# Patient Record
Sex: FEMALE | Race: WHITE | NOT HISPANIC OR LATINO | Employment: OTHER | URBAN - METROPOLITAN AREA
[De-identification: names, ages, dates, MRNs, and addresses within clinical notes are randomized per-mention and may not be internally consistent; named-entity substitution may affect disease eponyms.]

---

## 2017-02-03 ENCOUNTER — COMMUNICATION - HEALTHEAST (OUTPATIENT)
Dept: FAMILY MEDICINE | Facility: CLINIC | Age: 82
End: 2017-02-03

## 2017-02-06 ENCOUNTER — COMMUNICATION - HEALTHEAST (OUTPATIENT)
Dept: FAMILY MEDICINE | Facility: CLINIC | Age: 82
End: 2017-02-06

## 2017-02-06 DIAGNOSIS — I10 ESSENTIAL HYPERTENSION, BENIGN: ICD-10-CM

## 2017-02-22 ENCOUNTER — OFFICE VISIT - HEALTHEAST (OUTPATIENT)
Dept: FAMILY MEDICINE | Facility: CLINIC | Age: 82
End: 2017-02-22

## 2017-02-22 DIAGNOSIS — I10 ESSENTIAL HYPERTENSION: ICD-10-CM

## 2017-02-22 ASSESSMENT — MIFFLIN-ST. JEOR: SCORE: 868.55

## 2017-03-03 ENCOUNTER — COMMUNICATION - HEALTHEAST (OUTPATIENT)
Dept: FAMILY MEDICINE | Facility: CLINIC | Age: 82
End: 2017-03-03

## 2017-05-01 ENCOUNTER — COMMUNICATION - HEALTHEAST (OUTPATIENT)
Dept: FAMILY MEDICINE | Facility: CLINIC | Age: 82
End: 2017-05-01

## 2017-05-01 DIAGNOSIS — I10 ESSENTIAL HYPERTENSION, BENIGN: ICD-10-CM

## 2017-05-30 ENCOUNTER — COMMUNICATION - HEALTHEAST (OUTPATIENT)
Dept: FAMILY MEDICINE | Facility: CLINIC | Age: 82
End: 2017-05-30

## 2017-05-30 DIAGNOSIS — I10 ESSENTIAL HYPERTENSION, BENIGN: ICD-10-CM

## 2017-07-30 ENCOUNTER — COMMUNICATION - HEALTHEAST (OUTPATIENT)
Dept: FAMILY MEDICINE | Facility: CLINIC | Age: 82
End: 2017-07-30

## 2017-07-30 DIAGNOSIS — I10 ESSENTIAL HYPERTENSION, BENIGN: ICD-10-CM

## 2017-07-31 ENCOUNTER — COMMUNICATION - HEALTHEAST (OUTPATIENT)
Dept: FAMILY MEDICINE | Facility: CLINIC | Age: 82
End: 2017-07-31

## 2017-07-31 DIAGNOSIS — I10 ESSENTIAL HYPERTENSION, BENIGN: ICD-10-CM

## 2017-08-16 ENCOUNTER — COMMUNICATION - HEALTHEAST (OUTPATIENT)
Dept: SCHEDULING | Facility: CLINIC | Age: 82
End: 2017-08-16

## 2017-09-16 ENCOUNTER — COMMUNICATION - HEALTHEAST (OUTPATIENT)
Dept: SCHEDULING | Facility: CLINIC | Age: 82
End: 2017-09-16

## 2017-09-17 ENCOUNTER — AMBULATORY - HEALTHEAST (OUTPATIENT)
Dept: OTHER | Facility: CLINIC | Age: 82
End: 2017-09-17

## 2017-09-19 ENCOUNTER — AMBULATORY - HEALTHEAST (OUTPATIENT)
Dept: NEUROSURGERY | Facility: CLINIC | Age: 82
End: 2017-09-19

## 2017-09-19 ENCOUNTER — COMMUNICATION - HEALTHEAST (OUTPATIENT)
Dept: NEUROSURGERY | Facility: CLINIC | Age: 82
End: 2017-09-19

## 2017-09-19 DIAGNOSIS — S32.001S LUMBAR BURST FRACTURE, SEQUELA: ICD-10-CM

## 2017-09-19 DIAGNOSIS — S32.019A: ICD-10-CM

## 2017-09-22 ENCOUNTER — OFFICE VISIT - HEALTHEAST (OUTPATIENT)
Dept: FAMILY MEDICINE | Facility: CLINIC | Age: 82
End: 2017-09-22

## 2017-09-22 ENCOUNTER — COMMUNICATION - HEALTHEAST (OUTPATIENT)
Dept: FAMILY MEDICINE | Facility: CLINIC | Age: 82
End: 2017-09-22

## 2017-09-22 ENCOUNTER — COMMUNICATION - HEALTHEAST (OUTPATIENT)
Dept: TELEHEALTH | Facility: CLINIC | Age: 82
End: 2017-09-22

## 2017-09-22 ENCOUNTER — RECORDS - HEALTHEAST (OUTPATIENT)
Dept: ADMINISTRATIVE | Facility: OTHER | Age: 82
End: 2017-09-22

## 2017-09-22 ENCOUNTER — HOME CARE/HOSPICE - HEALTHEAST (OUTPATIENT)
Dept: HOME HEALTH SERVICES | Facility: HOME HEALTH | Age: 82
End: 2017-09-22

## 2017-09-22 DIAGNOSIS — M48.50XA VERTEBRAL COMPRESSION FRACTURE (H): ICD-10-CM
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
## 2017-09-22 DIAGNOSIS — S32.10XA SACRAL FRACTURE (H): ICD-10-CM

## 2017-09-23 ENCOUNTER — COMMUNICATION - HEALTHEAST (OUTPATIENT)
Dept: HOME HEALTH SERVICES | Facility: HOME HEALTH | Age: 82
End: 2017-09-23

## 2017-09-24 ENCOUNTER — COMMUNICATION - HEALTHEAST (OUTPATIENT)
Dept: HOME HEALTH SERVICES | Facility: HOME HEALTH | Age: 82
End: 2017-09-24

## 2017-09-25 ENCOUNTER — COMMUNICATION - HEALTHEAST (OUTPATIENT)
Dept: FAMILY MEDICINE | Facility: CLINIC | Age: 82
End: 2017-09-25

## 2017-09-29 ENCOUNTER — RECORDS - HEALTHEAST (OUTPATIENT)
Dept: ADMINISTRATIVE | Facility: OTHER | Age: 82
End: 2017-09-29

## 2017-10-02 ENCOUNTER — RECORDS - HEALTHEAST (OUTPATIENT)
Dept: ADMINISTRATIVE | Facility: OTHER | Age: 82
End: 2017-10-02

## 2017-10-05 ENCOUNTER — RECORDS - HEALTHEAST (OUTPATIENT)
Dept: ADMINISTRATIVE | Facility: OTHER | Age: 82
End: 2017-10-05

## 2017-10-09 ENCOUNTER — RECORDS - HEALTHEAST (OUTPATIENT)
Dept: ADMINISTRATIVE | Facility: OTHER | Age: 82
End: 2017-10-09

## 2017-10-13 ENCOUNTER — RECORDS - HEALTHEAST (OUTPATIENT)
Dept: ADMINISTRATIVE | Facility: OTHER | Age: 82
End: 2017-10-13

## 2017-10-16 ENCOUNTER — HOSPITAL ENCOUNTER (OUTPATIENT)
Dept: RADIOLOGY | Facility: CLINIC | Age: 82
Discharge: HOME | End: 2017-10-16
Attending: NEUROLOGICAL SURGERY

## 2017-10-16 ENCOUNTER — OFFICE VISIT - HEALTHEAST (OUTPATIENT)
Dept: NEUROSURGERY | Facility: CLINIC | Age: 82
End: 2017-10-16

## 2017-10-16 DIAGNOSIS — S32.001S LUMBAR BURST FRACTURE, SEQUELA: ICD-10-CM

## 2017-10-16 DIAGNOSIS — S32.010A COMPRESSION FRACTURE OF L1 LUMBAR VERTEBRA (H): ICD-10-CM

## 2017-10-17 ENCOUNTER — COMMUNICATION - HEALTHEAST (OUTPATIENT)
Dept: FAMILY MEDICINE | Facility: CLINIC | Age: 82
End: 2017-10-17

## 2017-11-03 ENCOUNTER — COMMUNICATION - HEALTHEAST (OUTPATIENT)
Dept: FAMILY MEDICINE | Facility: CLINIC | Age: 82
End: 2017-11-03

## 2017-11-05 ENCOUNTER — COMMUNICATION - HEALTHEAST (OUTPATIENT)
Dept: FAMILY MEDICINE | Facility: CLINIC | Age: 82
End: 2017-11-05

## 2017-11-05 DIAGNOSIS — I10 ESSENTIAL HYPERTENSION, BENIGN: ICD-10-CM

## 2017-11-07 ENCOUNTER — RECORDS - HEALTHEAST (OUTPATIENT)
Dept: ADMINISTRATIVE | Facility: OTHER | Age: 82
End: 2017-11-07

## 2017-11-13 ENCOUNTER — HOSPITAL ENCOUNTER (OUTPATIENT)
Dept: RADIOLOGY | Facility: CLINIC | Age: 82
Discharge: HOME | End: 2017-11-13

## 2017-11-13 ENCOUNTER — OFFICE VISIT - HEALTHEAST (OUTPATIENT)
Dept: NEUROSURGERY | Facility: CLINIC | Age: 82
End: 2017-11-13

## 2017-11-13 DIAGNOSIS — S32.010A COMPRESSION FRACTURE OF L1 LUMBAR VERTEBRA (H): ICD-10-CM

## 2017-12-15 ENCOUNTER — OFFICE VISIT - HEALTHEAST (OUTPATIENT)
Dept: NEUROSURGERY | Facility: CLINIC | Age: 82
End: 2017-12-15

## 2017-12-15 ENCOUNTER — HOSPITAL ENCOUNTER (OUTPATIENT)
Dept: RADIOLOGY | Facility: CLINIC | Age: 82
Discharge: HOME | End: 2017-12-15

## 2017-12-15 DIAGNOSIS — S32.010A COMPRESSION FRACTURE OF L1 LUMBAR VERTEBRA (H): ICD-10-CM

## 2018-01-10 ENCOUNTER — COMMUNICATION - HEALTHEAST (OUTPATIENT)
Dept: FAMILY MEDICINE | Facility: CLINIC | Age: 83
End: 2018-01-10

## 2018-01-10 DIAGNOSIS — I10 ESSENTIAL HYPERTENSION, BENIGN: ICD-10-CM

## 2018-03-13 ENCOUNTER — COMMUNICATION - HEALTHEAST (OUTPATIENT)
Dept: FAMILY MEDICINE | Facility: CLINIC | Age: 83
End: 2018-03-13

## 2018-03-13 DIAGNOSIS — I10 ESSENTIAL HYPERTENSION, BENIGN: ICD-10-CM

## 2018-05-29 ENCOUNTER — COMMUNICATION - HEALTHEAST (OUTPATIENT)
Dept: FAMILY MEDICINE | Facility: CLINIC | Age: 83
End: 2018-05-29

## 2018-05-29 DIAGNOSIS — I10 ESSENTIAL HYPERTENSION, BENIGN: ICD-10-CM

## 2018-07-06 ENCOUNTER — OFFICE VISIT - HEALTHEAST (OUTPATIENT)
Dept: FAMILY MEDICINE | Facility: CLINIC | Age: 83
End: 2018-07-06

## 2018-07-06 DIAGNOSIS — E55.9 VITAMIN D DEFICIENCY: ICD-10-CM

## 2018-07-06 DIAGNOSIS — I10 WHITE COAT SYNDROME WITH HYPERTENSION: ICD-10-CM

## 2018-07-06 DIAGNOSIS — Z13.220 SCREENING, LIPID: ICD-10-CM

## 2018-07-06 DIAGNOSIS — I10 ESSENTIAL HYPERTENSION, BENIGN: ICD-10-CM

## 2018-07-06 DIAGNOSIS — D64.9 LOW HEMOGLOBIN: ICD-10-CM

## 2018-07-06 LAB
ANION GAP SERPL CALCULATED.3IONS-SCNC: 10 MMOL/L (ref 5–18)
BUN SERPL-MCNC: 22 MG/DL (ref 8–28)
CALCIUM SERPL-MCNC: 9.8 MG/DL (ref 8.5–10.5)
CHLORIDE BLD-SCNC: 96 MMOL/L (ref 98–107)
CO2 SERPL-SCNC: 28 MMOL/L (ref 22–31)
CREAT SERPL-MCNC: 0.72 MG/DL (ref 0.6–1.1)
GFR SERPL CREATININE-BSD FRML MDRD: >60 ML/MIN/1.73M2
GLUCOSE BLD-MCNC: 90 MG/DL (ref 70–125)
HGB BLD-MCNC: 12.8 G/DL (ref 12–16)
LDLC SERPL CALC-MCNC: 99 MG/DL
POTASSIUM BLD-SCNC: 4 MMOL/L (ref 3.5–5)
SODIUM SERPL-SCNC: 134 MMOL/L (ref 136–145)

## 2018-07-09 LAB — 25(OH)D3 SERPL-MCNC: 51.8 NG/ML (ref 30–80)

## 2018-07-30 ENCOUNTER — COMMUNICATION - HEALTHEAST (OUTPATIENT)
Dept: FAMILY MEDICINE | Facility: CLINIC | Age: 83
End: 2018-07-30

## 2018-08-03 ENCOUNTER — COMMUNICATION - HEALTHEAST (OUTPATIENT)
Dept: FAMILY MEDICINE | Facility: CLINIC | Age: 83
End: 2018-08-03

## 2018-08-03 DIAGNOSIS — I10 ESSENTIAL HYPERTENSION, BENIGN: ICD-10-CM

## 2018-10-29 ENCOUNTER — COMMUNICATION - HEALTHEAST (OUTPATIENT)
Dept: FAMILY MEDICINE | Facility: CLINIC | Age: 83
End: 2018-10-29

## 2018-11-14 ENCOUNTER — COMMUNICATION - HEALTHEAST (OUTPATIENT)
Dept: FAMILY MEDICINE | Facility: CLINIC | Age: 83
End: 2018-11-14

## 2018-11-14 DIAGNOSIS — I10 ESSENTIAL HYPERTENSION, BENIGN: ICD-10-CM

## 2018-11-15 ENCOUNTER — COMMUNICATION - HEALTHEAST (OUTPATIENT)
Dept: SCHEDULING | Facility: CLINIC | Age: 83
End: 2018-11-15

## 2018-12-15 ENCOUNTER — COMMUNICATION - HEALTHEAST (OUTPATIENT)
Dept: FAMILY MEDICINE | Facility: CLINIC | Age: 83
End: 2018-12-15

## 2018-12-15 DIAGNOSIS — I10 ESSENTIAL HYPERTENSION, BENIGN: ICD-10-CM

## 2018-12-20 ENCOUNTER — COMMUNICATION - HEALTHEAST (OUTPATIENT)
Dept: FAMILY MEDICINE | Facility: CLINIC | Age: 83
End: 2018-12-20

## 2019-02-14 ENCOUNTER — COMMUNICATION - HEALTHEAST (OUTPATIENT)
Dept: FAMILY MEDICINE | Facility: CLINIC | Age: 84
End: 2019-02-14

## 2019-02-14 DIAGNOSIS — I10 ESSENTIAL HYPERTENSION, BENIGN: ICD-10-CM

## 2019-06-11 ENCOUNTER — COMMUNICATION - HEALTHEAST (OUTPATIENT)
Dept: FAMILY MEDICINE | Facility: CLINIC | Age: 84
End: 2019-06-11

## 2019-06-11 DIAGNOSIS — I10 ESSENTIAL HYPERTENSION, BENIGN: ICD-10-CM

## 2019-07-02 ENCOUNTER — OFFICE VISIT - HEALTHEAST (OUTPATIENT)
Dept: FAMILY MEDICINE | Facility: CLINIC | Age: 84
End: 2019-07-02

## 2019-07-02 DIAGNOSIS — I10 ESSENTIAL HYPERTENSION, BENIGN: ICD-10-CM

## 2019-07-02 LAB
ANION GAP SERPL CALCULATED.3IONS-SCNC: 10 MMOL/L (ref 5–18)
BUN SERPL-MCNC: 25 MG/DL (ref 8–28)
CALCIUM SERPL-MCNC: 10 MG/DL (ref 8.5–10.5)
CHLORIDE BLD-SCNC: 98 MMOL/L (ref 98–107)
CO2 SERPL-SCNC: 28 MMOL/L (ref 22–31)
CREAT SERPL-MCNC: 0.77 MG/DL (ref 0.6–1.1)
GFR SERPL CREATININE-BSD FRML MDRD: >60 ML/MIN/1.73M2
GLUCOSE BLD-MCNC: 93 MG/DL (ref 70–125)
POTASSIUM BLD-SCNC: 4 MMOL/L (ref 3.5–5)
SODIUM SERPL-SCNC: 136 MMOL/L (ref 136–145)

## 2019-07-07 ENCOUNTER — COMMUNICATION - HEALTHEAST (OUTPATIENT)
Dept: FAMILY MEDICINE | Facility: CLINIC | Age: 84
End: 2019-07-07

## 2019-11-11 ENCOUNTER — COMMUNICATION - HEALTHEAST (OUTPATIENT)
Dept: FAMILY MEDICINE | Facility: CLINIC | Age: 84
End: 2019-11-11

## 2019-11-11 DIAGNOSIS — I10 ESSENTIAL HYPERTENSION, BENIGN: ICD-10-CM

## 2020-01-09 ENCOUNTER — COMMUNICATION - HEALTHEAST (OUTPATIENT)
Dept: FAMILY MEDICINE | Facility: CLINIC | Age: 85
End: 2020-01-09

## 2020-01-09 DIAGNOSIS — I10 ESSENTIAL HYPERTENSION, BENIGN: ICD-10-CM

## 2020-03-05 ENCOUNTER — COMMUNICATION - HEALTHEAST (OUTPATIENT)
Dept: FAMILY MEDICINE | Facility: CLINIC | Age: 85
End: 2020-03-05

## 2020-03-05 DIAGNOSIS — I10 ESSENTIAL HYPERTENSION, BENIGN: ICD-10-CM

## 2020-07-26 ENCOUNTER — AMBULATORY - HEALTHEAST (OUTPATIENT)
Dept: FAMILY MEDICINE | Facility: CLINIC | Age: 85
End: 2020-07-26

## 2020-07-26 DIAGNOSIS — I10 ESSENTIAL HYPERTENSION, BENIGN: ICD-10-CM

## 2020-08-10 ENCOUNTER — OFFICE VISIT - HEALTHEAST (OUTPATIENT)
Dept: FAMILY MEDICINE | Facility: CLINIC | Age: 85
End: 2020-08-10

## 2020-08-10 DIAGNOSIS — N18.30 CKD (CHRONIC KIDNEY DISEASE) STAGE 3, GFR 30-59 ML/MIN (H): ICD-10-CM

## 2020-08-10 DIAGNOSIS — I10 ESSENTIAL HYPERTENSION, BENIGN: ICD-10-CM

## 2021-02-02 ENCOUNTER — COMMUNICATION - HEALTHEAST (OUTPATIENT)
Dept: FAMILY MEDICINE | Facility: CLINIC | Age: 86
End: 2021-02-02

## 2021-02-02 DIAGNOSIS — I10 ESSENTIAL HYPERTENSION, BENIGN: ICD-10-CM

## 2021-02-04 ENCOUNTER — COMMUNICATION - HEALTHEAST (OUTPATIENT)
Dept: FAMILY MEDICINE | Facility: CLINIC | Age: 86
End: 2021-02-04

## 2021-02-12 ENCOUNTER — AMBULATORY - HEALTHEAST (OUTPATIENT)
Dept: NURSING | Facility: CLINIC | Age: 86
End: 2021-02-12

## 2021-02-23 ENCOUNTER — COMMUNICATION - HEALTHEAST (OUTPATIENT)
Dept: INTERNAL MEDICINE | Facility: CLINIC | Age: 86
End: 2021-02-23

## 2021-02-24 ENCOUNTER — COMMUNICATION - HEALTHEAST (OUTPATIENT)
Dept: INTERNAL MEDICINE | Facility: CLINIC | Age: 86
End: 2021-02-24

## 2021-03-01 ENCOUNTER — COMMUNICATION - HEALTHEAST (OUTPATIENT)
Dept: FAMILY MEDICINE | Facility: CLINIC | Age: 86
End: 2021-03-01

## 2021-03-01 DIAGNOSIS — I10 ESSENTIAL HYPERTENSION, BENIGN: ICD-10-CM

## 2021-03-05 ENCOUNTER — AMBULATORY - HEALTHEAST (OUTPATIENT)
Dept: NURSING | Facility: CLINIC | Age: 86
End: 2021-03-05

## 2021-03-05 ENCOUNTER — OFFICE VISIT - HEALTHEAST (OUTPATIENT)
Dept: FAMILY MEDICINE | Facility: CLINIC | Age: 86
End: 2021-03-05

## 2021-03-05 DIAGNOSIS — I10 ESSENTIAL HYPERTENSION, BENIGN: ICD-10-CM

## 2021-03-05 DIAGNOSIS — I49.9 IRREGULAR HEART BEAT: ICD-10-CM

## 2021-03-05 DIAGNOSIS — M54.2 NECK PAIN: ICD-10-CM

## 2021-03-05 LAB
ALBUMIN SERPL-MCNC: 4.3 G/DL (ref 3.5–5)
ALP SERPL-CCNC: 83 U/L (ref 45–120)
ALT SERPL W P-5'-P-CCNC: 19 U/L (ref 0–45)
ANION GAP SERPL CALCULATED.3IONS-SCNC: 14 MMOL/L (ref 5–18)
AST SERPL W P-5'-P-CCNC: 19 U/L (ref 0–40)
BILIRUB SERPL-MCNC: 1.2 MG/DL (ref 0–1)
BUN SERPL-MCNC: 28 MG/DL (ref 8–28)
CALCIUM SERPL-MCNC: 9.7 MG/DL (ref 8.5–10.5)
CHLORIDE BLD-SCNC: 100 MMOL/L (ref 98–107)
CO2 SERPL-SCNC: 27 MMOL/L (ref 22–31)
CREAT SERPL-MCNC: 0.78 MG/DL (ref 0.6–1.1)
GFR SERPL CREATININE-BSD FRML MDRD: >60 ML/MIN/1.73M2
GLUCOSE BLD-MCNC: 89 MG/DL (ref 70–125)
POTASSIUM BLD-SCNC: 3.8 MMOL/L (ref 3.5–5)
PROT SERPL-MCNC: 7.8 G/DL (ref 6–8)
SODIUM SERPL-SCNC: 141 MMOL/L (ref 136–145)

## 2021-03-05 RX ORDER — BENAZEPRIL/HYDROCHLOROTHIAZIDE 20 MG-25MG
1 TABLET ORAL DAILY
Qty: 90 TABLET | Refills: 3 | Status: SHIPPED | OUTPATIENT
Start: 2021-03-05 | End: 2021-07-16

## 2021-03-05 RX ORDER — CLONIDINE HYDROCHLORIDE 0.2 MG/1
TABLET ORAL
Qty: 135 TABLET | Refills: 3 | Status: SHIPPED | OUTPATIENT
Start: 2021-03-05 | End: 2021-07-16

## 2021-03-12 ENCOUNTER — OFFICE VISIT - HEALTHEAST (OUTPATIENT)
Dept: FAMILY MEDICINE | Facility: CLINIC | Age: 86
End: 2021-03-12

## 2021-03-12 DIAGNOSIS — I48.92 ATRIAL FLUTTER WITH RAPID VENTRICULAR RESPONSE (H): ICD-10-CM

## 2021-03-12 DIAGNOSIS — I49.9 IRREGULAR HEART BEAT: ICD-10-CM

## 2021-03-13 LAB
ATRIAL RATE - MUSE: 125 BPM
DIASTOLIC BLOOD PRESSURE - MUSE: NORMAL
INTERPRETATION ECG - MUSE: NORMAL
P AXIS - MUSE: NORMAL
PR INTERVAL - MUSE: NORMAL
QRS DURATION - MUSE: 74 MS
QT - MUSE: 266 MS
QTC - MUSE: 420 MS
R AXIS - MUSE: 4 DEGREES
SYSTOLIC BLOOD PRESSURE - MUSE: NORMAL
T AXIS - MUSE: -21 DEGREES
VENTRICULAR RATE- MUSE: 150 BPM

## 2021-03-19 ENCOUNTER — OFFICE VISIT - HEALTHEAST (OUTPATIENT)
Dept: FAMILY MEDICINE | Facility: CLINIC | Age: 86
End: 2021-03-19

## 2021-03-19 DIAGNOSIS — Z13.29 SCREENING FOR THYROID DISORDER: ICD-10-CM

## 2021-03-19 DIAGNOSIS — H90.3 SENSORINEURAL HEARING LOSS (SNHL) OF BOTH EARS: ICD-10-CM

## 2021-03-19 DIAGNOSIS — I48.92 ATRIAL FLUTTER WITH RAPID VENTRICULAR RESPONSE (H): ICD-10-CM

## 2021-03-19 LAB
ATRIAL RATE - MUSE: 75 BPM
DIASTOLIC BLOOD PRESSURE - MUSE: NORMAL
ERYTHROCYTE [DISTWIDTH] IN BLOOD BY AUTOMATED COUNT: 13 % (ref 11–14.5)
HCT VFR BLD AUTO: 39.4 % (ref 35–47)
HGB BLD-MCNC: 12.7 G/DL (ref 12–16)
INTERPRETATION ECG - MUSE: NORMAL
MCH RBC QN AUTO: 30 PG (ref 27–34)
MCHC RBC AUTO-ENTMCNC: 32.2 G/DL (ref 32–36)
MCV RBC AUTO: 93 FL (ref 80–100)
P AXIS - MUSE: 77 DEGREES
PLATELET # BLD AUTO: 376 THOU/UL (ref 140–440)
PMV BLD AUTO: 8.9 FL (ref 7–10)
PR INTERVAL - MUSE: 166 MS
QRS DURATION - MUSE: 88 MS
QT - MUSE: 418 MS
QTC - MUSE: 466 MS
R AXIS - MUSE: -2 DEGREES
RBC # BLD AUTO: 4.24 MILL/UL (ref 3.8–5.4)
SYSTOLIC BLOOD PRESSURE - MUSE: NORMAL
T AXIS - MUSE: 36 DEGREES
VENTRICULAR RATE- MUSE: 75 BPM
WBC: 9 THOU/UL (ref 4–11)

## 2021-03-19 RX ORDER — METOPROLOL SUCCINATE 50 MG/1
50 TABLET, EXTENDED RELEASE ORAL DAILY
Qty: 90 TABLET | Refills: 1 | Status: SHIPPED | OUTPATIENT
Start: 2021-03-19 | End: 2021-07-16

## 2021-05-25 ENCOUNTER — RECORDS - HEALTHEAST (OUTPATIENT)
Dept: ADMINISTRATIVE | Facility: CLINIC | Age: 86
End: 2021-05-25

## 2021-05-26 ENCOUNTER — RECORDS - HEALTHEAST (OUTPATIENT)
Dept: ADMINISTRATIVE | Facility: CLINIC | Age: 86
End: 2021-05-26

## 2021-05-27 ENCOUNTER — COMMUNICATION - HEALTHEAST (OUTPATIENT)
Dept: SCHEDULING | Facility: CLINIC | Age: 86
End: 2021-05-27

## 2021-05-27 ENCOUNTER — RECORDS - HEALTHEAST (OUTPATIENT)
Dept: ADMINISTRATIVE | Facility: CLINIC | Age: 86
End: 2021-05-27

## 2021-05-27 VITALS
TEMPERATURE: 98 F | HEART RATE: 86 BPM | DIASTOLIC BLOOD PRESSURE: 64 MMHG | OXYGEN SATURATION: 94 % | SYSTOLIC BLOOD PRESSURE: 136 MMHG

## 2021-05-28 ENCOUNTER — RECORDS - HEALTHEAST (OUTPATIENT)
Dept: ADMINISTRATIVE | Facility: CLINIC | Age: 86
End: 2021-05-28

## 2021-05-29 ENCOUNTER — RECORDS - HEALTHEAST (OUTPATIENT)
Dept: ADMINISTRATIVE | Facility: CLINIC | Age: 86
End: 2021-05-29

## 2021-05-29 NOTE — TELEPHONE ENCOUNTER
Due to be seen    Rx renewed per Medication Renewal Policy. Medication was last renewed on 12/18/18.    Amisha Cary, [facility] Triage/Med Refill 6/12/2019     Requested Prescriptions   Pending Prescriptions Disp Refills     cloNIDine HCl (CATAPRES) 0.2 MG tablet [Pharmacy Med Name: CLONIDINE 0.2MG TABLETS] 135 tablet 0     Sig: TAKE 1/2 TABLET BY MOUTH IN THE AFTERNOON AND 1 TABLET EVERY NIGHT AT BEDTIME       Clonidine/Hydralazine Refill Protocol Passed - 6/11/2019  3:58 AM        Passed - PCP or prescribing provider visit in past 12 months       Last office visit with prescriber/PCP: 7/6/2018 Randi Marques MD OR same dept: 7/6/2018 Randi Marques MD OR same specialty: 7/6/2018 Randi Marques MD  Last physical: Visit date not found Last MTM visit: Visit date not found   Next visit within 3 mo: Visit date not found  Next physical within 3 mo: Visit date not found  Prescriber OR PCP: Randi Marques MD  Last diagnosis associated with med order: 1. Benign Essential Hypertension  - cloNIDine HCl (CATAPRES) 0.2 MG tablet [Pharmacy Med Name: CLONIDINE 0.2MG TABLETS]; TAKE 1/2 TABLET BY MOUTH IN THE AFTERNOON AND 1 TABLET EVERY NIGHT AT BEDTIME  Dispense: 135 tablet; Refill: 0    If protocol passes may refill for 12 months if within 3 months of last provider visit (or a total of 15 months).             Passed - Blood pressure filed in past 12 months     BP Readings from Last 1 Encounters:   07/06/18 (!) 152/110

## 2021-05-30 VITALS — WEIGHT: 116 LBS | HEIGHT: 61 IN | BODY MASS INDEX: 21.9 KG/M2

## 2021-05-30 NOTE — PROGRESS NOTES
Assessment and Plan    1. Benign Essential Hypertension  Claims white coat hypertension and better readings at home; they have been consistently high here. She does not want to add on medication.  Asked her to have them read at [facility], also to come here for follow up readings  - Basic Metabolic Panel  - cloNIDine HCl (CATAPRES) 0.2 MG tablet; TAKE 1/2 TABLET BY MOUTH IN THE AFTERNOON AND 1 TABLET EVERY NIGHT AT BEDTIME  Dispense: 135 tablet; Refill: 1  - benazepril-hydrochlorthiazide (LOTENSIN HCT) 20-25 mg per tablet; Take 1 tablet by mouth daily.  Dispense: 90 tablet; Refill: 1      There are no Patient Instructions on file for this visit.    Return in about 1 year (around 7/2/2020) for or earlier as needed if blood pressure readings  do not come down.    Randi Marques MD     -------------------------------------------    Chief Complaint   Patient presents with     Medication check     Natalia starts by saying that she is healthy overall, very active - lives nahomy big house - does cleaning and vacuuming. She has a good appetitive and eats well and has not digestive issues.    Hypertension: chronically high here - she says white coat hypertension -  and she says at home her measurements are better at home.  Her son lives with her - Son took blood pressure last night 126/8 last night  Also checked previously and the reading was  145/70.  She has  no chest pains, palpitations or dyspnea    BP Readings from Last 3 Encounters:   07/02/19 180/90   07/06/18 (!) 152/110   12/15/17 112/70       She then says she wants a handicapped sticker.  She used to have one and it was convenient - she had this at a time when she was suffering back issues, which are now better.  I explained that these stickers are for people with handicaps, and considering herself of being active, living in a two times a day house and doing the house cleaning, I cannot fill out that form for her.  She wondered if she could just get one based on age  - I was clear that it was ONLY physical inabilities with specific limitations on walking that qualify people for such a sticker.  She accepted this.  She doesn't drive - her son drives her.  She agrees that he can drop her off, park, then at the end get the car and pick her up    Preventive   - described rationale for checking bone density and preventing hip fracture if risk is low.  She say she  has fallen (tripped - not dizzy or light headed ) before and never broken anything, nor would she want to be on new medication, to declines DEXA    - she also declines new shingles vaccine        Current Outpatient Medications on File Prior to Visit   Medication Sig Dispense Refill     cholecalciferol, vitamin D3, (D3-2000) 2,000 unit capsule Take 4,000 Units by mouth daily.       hypromellose (ARTIFICIAL TEARS,[id number],) Drop Administer 1 drop to both eyes 2 (two) times a day.       ibuprofen (ADVIL,MOTRIN) 200 MG tablet Take 200 mg by mouth every 6 (six) hours as needed for pain.       aspirin 81 MG EC tablet Take 81 mg by mouth daily as needed for pain.        No current facility-administered medications on file prior to visit.          There are no preventive care reminders to display for this patient.  Health Maintenance reviewed - discussed shingles - declines here - consider getting on list in pharmacy.    The history section was last reviewed by Rhea Rainey RN on Dec 15, 2017.    Social History     Tobacco Use   Smoking Status Never Smoker   Smokeless Tobacco Never Used       Social History     Substance and Sexual Activity   Alcohol Use Yes    Comment: 1 glass of red wine with dinner.         Vitals:    07/02/19 1644   BP: 180/90   Pulse: 63   SpO2: 99%     Body mass index is 21.56 kg/m .     EXAM:    General appearance - alert, well appearing, and in no distress  Mental status - normal mood, behavior, speech, dress, motor activity, and thought processes  Ears - bilateral TM's and external ear canals  normal  Mouth - mucous membranes moist, pharynx normal without lesions  Neck - supple, no significant adenopathy, carotids upstroke normal bilaterally, no bruits  Chest - clear to auscultation, no wheezes, rales or rhonchi, symmetric air entry  Heart - normal rate, regular rhythm, normal S1, S2, no murmurs, rubs, clicks or gallops  Abdomen - soft, nontender, nondistended, no masses or organomegaly  Extremities - peripheral pulses normal, no pedal edema, no clubbing or cyanosis

## 2021-06-01 VITALS — BODY MASS INDEX: 21.16 KG/M2 | WEIGHT: 112 LBS

## 2021-06-03 VITALS — BODY MASS INDEX: 21.56 KG/M2 | WEIGHT: 114.12 LBS

## 2021-06-03 NOTE — TELEPHONE ENCOUNTER
RN Med Refill Request:    Last refill 7/2/19 for 90 tablets with one refill.    Will refuse encounter as refill requested too early.    Anat Sheldon RN   [facility] RN Triage

## 2021-06-05 VITALS
SYSTOLIC BLOOD PRESSURE: 152 MMHG | WEIGHT: 115.7 LBS | DIASTOLIC BLOOD PRESSURE: 80 MMHG | TEMPERATURE: 98.6 F | BODY MASS INDEX: 21.86 KG/M2

## 2021-06-05 VITALS
OXYGEN SATURATION: 98 % | WEIGHT: 119.3 LBS | BODY MASS INDEX: 22.54 KG/M2 | DIASTOLIC BLOOD PRESSURE: 84 MMHG | SYSTOLIC BLOOD PRESSURE: 148 MMHG | HEART RATE: 82 BPM | TEMPERATURE: 97.6 F

## 2021-06-05 NOTE — TELEPHONE ENCOUNTER
Refill Approved    Rx renewed per Medication Renewal Policy. Medication was last renewed on 7/2/19.    Amisha Cary, [facility] Triage/Med Refill 1/13/2020     Requested Prescriptions   Pending Prescriptions Disp Refills     cloNIDine HCl (CATAPRES) 0.2 MG tablet [Pharmacy Med Name: CLONIDINE 0.2MG TABLETS] 135 tablet 1     Sig: TAKE 1/2 TABLET BY MOUTH IN THE AFTERNOON AND 1 TABLET EVERY NIGHT AT BEDTIME       Clonidine/Hydralazine Refill Protocol Passed - 1/9/2020  5:20 PM        Passed - PCP or prescribing provider visit in past 12 months       Last office visit with prescriber/PCP: 7/2/2019 Randi Marques MD OR same dept: 7/2/2019 Randi Marques MD OR same specialty: 7/2/2019 Randi Marques MD  Last physical: Visit date not found Last MTM visit: Visit date not found   Next visit within 3 mo: Visit date not found  Next physical within 3 mo: Visit date not found  Prescriber OR PCP: Randi Marques MD  Last diagnosis associated with med order: 1. Benign Essential Hypertension  - cloNIDine HCl (CATAPRES) 0.2 MG tablet [Pharmacy Med Name: CLONIDINE 0.2MG TABLETS]; TAKE 1/2 TABLET BY MOUTH IN THE AFTERNOON AND 1 TABLET EVERY NIGHT AT BEDTIME  Dispense: 135 tablet; Refill: 1    If protocol passes may refill for 12 months if within 3 months of last provider visit (or a total of 15 months).             Passed - Blood pressure filed in past 12 months     BP Readings from Last 1 Encounters:   07/02/19 180/90

## 2021-06-06 NOTE — TELEPHONE ENCOUNTER
Refill Approved    Rx renewed per Medication Renewal Policy. Medication was last renewed on 7/2/19.    Amisha Cary, [facility] Triage/Med Refill 3/5/2020     Requested Prescriptions   Pending Prescriptions Disp Refills     benazepril-hydrochlorthiazide (LOTENSIN HCT) 20-25 mg per tablet 90 tablet 1     Sig: Take 1 tablet by mouth daily.       Diuretics/Combination Diuretics Refill Protocol  Passed - 3/5/2020  4:50 PM        Passed - Visit with PCP or prescribing provider visit in past 12 months     Last office visit with prescriber/PCP: 7/2/2019 Randi Marques MD OR same dept: 7/2/2019 Randi Marques MD OR same specialty: 7/2/2019 Randi Marques MD  Last physical: Visit date not found Last MTM visit: Visit date not found   Next visit within 3 mo: Visit date not found  Next physical within 3 mo: Visit date not found  Prescriber OR PCP: Randi Marques MD  Last diagnosis associated with med order: 1. Benign Essential Hypertension  - benazepril-hydrochlorthiazide (LOTENSIN HCT) 20-25 mg per tablet; Take 1 tablet by mouth daily.  Dispense: 90 tablet; Refill: 1    If protocol passes may refill for 12 months if within 3 months of last provider visit (or a total of 15 months).             Passed - Serum Potassium in past 12 months      Lab Results   Component Value Date    Potassium 4.0 07/02/2019             Passed - Serum Sodium in past 12 months      Lab Results   Component Value Date    Sodium 136 07/02/2019             Passed - Blood pressure on file in past 12 months     BP Readings from Last 1 Encounters:   07/02/19 180/90             Passed - Serum Creatinine in past 12 months      Creatinine   Date Value Ref Range Status   07/02/2019 0.77 0.60 - 1.10 mg/dL Final

## 2021-06-06 NOTE — TELEPHONE ENCOUNTER
Refill Request  Did you contact pharmacy: No  Medication name:   Requested Prescriptions     Pending Prescriptions Disp Refills     benazepril-hydrochlorthiazide (LOTENSIN HCT) 20-25 mg per tablet 90 tablet 1     Sig: Take 1 tablet by mouth daily.     Who prescribed the medication: Randi Marques MD   Requested Pharmacy: [id number]  Is patient out of medication: No.  2 days left  Patient notified refills processed in 3 business days:  no  Okay to leave a detailed message: yes

## 2021-06-09 NOTE — PROGRESS NOTES
Assessment/Plan:        Diagnoses and all orders for this visit:    Essential hypertension    Her BP was elevated today but it has been generallly good at home. She brings in her numbners for the last several days. We decided to keeo her BP   Medication as it is, to have her check her BP daily for the next week, and then she is going to call us with these numbers.         Subjective:    Patient ID: Adebayo Roach is a 89 y.o. female.    Hypertension   This is a chronic problem. The current episode started more than 1 year ago. The problem is unchanged. The problem is controlled. Pertinent negatives include no chest pain, headaches or palpitations. (She says she feels great ) There are no associated agents to hypertension. Risk factors for coronary artery disease include post-menopausal state. Past treatments include ACE inhibitors, alpha 1 blockers and diuretics. The current treatment provides significant improvement. There are no compliance problems.             Review of Systems   Constitutional: Negative for activity change, appetite change, fatigue and unexpected weight change.   Respiratory: Negative for cough.    Cardiovascular: Negative for chest pain, palpitations and leg swelling.   Skin: Negative for rash.   Neurological: Negative for weakness, light-headedness and headaches.   Psychiatric/Behavioral: Positive for dysphoric mood.   All other systems reviewed and are negative.            Objective:    Physical Exam   Constitutional: She is oriented to person, place, and time. She appears well-developed and well-nourished. No distress.   Cardiovascular: Normal rate, regular rhythm and normal heart sounds.  Exam reveals no gallop and no friction rub.    No murmur heard.  Pulmonary/Chest: Effort normal and breath sounds normal. She has no wheezes. She has no rales.   Musculoskeletal: She exhibits no edema.   Neurological: She is alert and oriented to person, place, and time.   Nursing note and vitals  reviewed.

## 2021-06-10 NOTE — PROGRESS NOTES
Got a call from son of patient to my personal cell  phone number - used for on call (I am not on  Call).  Son said that  [facility] said they have had sent many requests to us and had not heard anything.  So they called answering service and who advised them to contact me AND connected them to my phone.  The son asked if I could get this RX in before closing - I said I would do my betst The phone  Number I received incoming was [phone] (central scheduling) - not clear how patient family got to this number.  I was not near a computer when I took the calls - explained that if it had been over a year since patient had seen me, I would only give a short refill.    It turns out I have not seen Natalia in over a year, so I was able to get a short refill in before closing    I called [facility] - they had send faxes to fax number listed on RX - which would have been [facility] - now hibernating.    I called [facility] - they do not understand how this happened.

## 2021-06-10 NOTE — PROGRESS NOTES
"  No problem-specific Assessment & Plan notes found for this encounter.      Adebayo Roach is a 89 y.o. female who is being evaluated via a billable telephone visit.      The patient has been notified of following:     \"This telephone visit will be conducted via a call between you and your physician/provider. We have found that certain health care needs can be provided without the need for a physical exam.  This service lets us provide the care you need with a short phone conversation.  If a prescription is necessary we can send it directly to your pharmacy.  If lab work is needed we can place an order for that and you can then stop by our lab to have the test done at a later time.    If during the course of the call the physician/provider feels a telephone visit is not appropriate, you will not be charged for this service.\"     Physician has received verbal consent for a Telephone Visit from the patient? Yes    Adebayo Roach complains of    Chief Complaint   Patient presents with     Medication Check       I have reviewed and updated the patient's Past Medical History, Social History, Family History and Medication List.    ALLERGIES  Alendronate    Additional provider notes: insert own note template here see prob based     Assessment/Plan:  1. Benign Essential Hypertension  No concerns  Out of medication >> clonidine    Check CMP to ensure med not doing harm  Follow up with BHARATH Marques when able  Goal BP < 160 sytolic and < 85 diastoic or lower without symptoms of orthostasis or renal insufficiency  Last GFR Stage 2-3 CKD  2019 but affected by age Check Cystatin C     Results for orders placed or performed during the hospital encounter of 09/16/17   Comprehensive Metabolic Panel   Result Value Ref Range    Sodium 135 (L) 136 - 145 mmol/L    Potassium 4.7 3.5 - 5.0 mmol/L    Chloride 99 98 - 107 mmol/L    CO2 28 22 - 31 mmol/L    Anion Gap, Calculation 8 5 - 18 mmol/L    Glucose 119 70 - 125 mg/dL    BUN 35 " (H) 8 - 28 mg/dL    Creatinine 1.18 (H) 0.60 - 1.10 mg/dL    GFR MDRD Af Amer 52 (L) >60 mL/min/1.73m2    GFR MDRD Non Af Amer 43 (L) >60 mL/min/1.73m2    Bilirubin, Total 0.4 0.0 - 1.0 mg/dL    Calcium 9.1 8.5 - 10.5 mg/dL    Protein, Total 6.5 6.0 - 8.0 g/dL    Albumin 2.7 (L) 3.5 - 5.0 g/dL    Alkaline Phosphatase 59 45 - 120 U/L    AST 15 0 - 40 U/L    ALT 14 0 - 45 U/L       - benazepril-hydrochlorthiazide (LOTENSIN HCT) 20-25 mg per tablet; Take 1 tablet by mouth daily.  Dispense: 90 tablet; Refill: 1  - cloNIDine HCL (CATAPRES) 0.2 MG tablet; TAKE 1/2 TABLET BY MOUTH IN THE AFTERNOON AND 1 TABLET EVERY NIGHT AT BEDTIME  Dispense: 135 tablet; Refill: 3        Phone call duration:  8 minutes   2:34 - 2:42    Akshat Shoemaker MD

## 2021-06-13 NOTE — PROGRESS NOTES
CHART NOTE     DATE OF SERVICE:  10/16/2017     : 10/28/1927   89 y.o.     ASSESSMENT :       PLAN:     HPI:  Adebayo Roach is status post hospital consult by DR. CRAIN on 17 for  L1 BURST FRACTURE  Patient initially presented s/p fall and found to have L1 lburst fx with approximately 40%  height loss centrally and 6 mm of posterior displacement of the posterior superior cortex. Also referred to Ortho for sacral fx which required no treatment. Placed in TLSO for L1 fx stabilization. Also follow up with PCP regarding osteoporosis treatment if needed (Jamey), maintain vitamin D level of 45-50.     TODAY, Adebayo Roach cancelled her appt

## 2021-06-13 NOTE — PROGRESS NOTES
Subjective:Adebayo is an 89-year-old lady who is here with her 3 children today to discuss her fall.  This all began on  9/16/17 when she was vacuuming the house.  She was about 2 steps from the bottom of the stairway and fell backwards onto her head and back.  She went to the emergency room at that time with and was evaluated.  She was discharged home with pain medications.  Her pain however worsened to the point where she could not get out of bed.  Her son got her back to the emergency room where she was admitted for pain control.  She she had crafted a TLSO back brace which she is wearing at present.  She states that that really helps when she is up and walking.  It is somewhat difficult for her children to get this on her as they were given directions to not move her if possible on evaluation in the emergency Compression fracture of L1 and also a sacral fracture.  She was prescribed oxycodone for pain relief and also advised that she could use ibuprofen or Tylenol instead.  Her children are concerned because they are having difficulty getting the back brace on her in the morning.  They are also having difficulties with getting her to rest and toileting.  They are wondering what we can do about this.  Adebayo says that she is feeling pretty good.  She realizes that when she moves it hurts a great deal but if she has the brace on and she is fairly still she is feeling pretty good.  She did not apparently lose consciousness with her fall.  There have been no instances of confusion.  They also brought paperwork for me to complete which included a disability parking certificate, 2 forms from [facility] 1 for a lift chair,, and 1 for a hospital bed.  Patient Active Problem List   Diagnosis     Osteoarthritis     Abnormal Blood Chemistry     Benign Essential Hypertension     Reactive Hypertension     Compression fracture of L1 lumbar vertebra     Backache without radiation     Burst fracture of lumbar vertebra,  closed, initial encounter     Current Outpatient Prescriptions on File Prior to Visit   Medication Sig Dispense Refill     benazepril-hydrochlorthiazide (LOTENSIN HCT) 20-25 mg per tablet TAKE 1 TABLET BY MOUTH DAILY 90 tablet 0     cloNIDine HCl (CATAPRES) 0.2 MG tablet Clonidine 0.2 mg  take 1/2 tablet PO at noon and 1 tablet PO in the evening 30 tablet 0     hypromellose (ARTIFICIAL TEARS,[id number],) Drop Administer 1 drop to both eyes 2 (two) times a day.       ibuprofen (ADVIL,MOTRIN) 200 MG tablet Take 200 mg by mouth every 6 (six) hours as needed for pain.       ondansetron (ZOFRAN, AS HYDROCHLORIDE,) 4 MG tablet Take 1 tablet (4 mg total) by mouth every 8 (eight) hours as needed for nausea. 20 tablet 0     oxyCODONE (ROXICODONE) 5 MG immediate release tablet Take 0.5-1 tablets (2.5-5 mg total) by mouth every 6 (six) hours as needed for pain. 30 tablet 0     senna-docusate (PERICOLACE) 8.6-50 mg tablet Take 1 tablet by mouth 2 (two) times a day.  0     aspirin 81 MG EC tablet Take 81 mg by mouth daily as needed for pain.        No current facility-administered medications on file prior to visit.      Social History     Social History     Marital status:      Spouse name: N/A     Number of children: N/A     Years of education: N/A     Occupational History     Not on file.     Social History Main Topics     Smoking status: Never Smoker     Smokeless tobacco: Never Used     Alcohol use Yes      Comment: 1 glass of red wine with dinner.     Drug use: No     Sexual activity: Not on file     Other Topics Concern     Not on file     Social History Narrative    Patient is accompanied by 4 family members. 09/16/17     Ms. Roach had no medications administered during this visit.   Family history was reviewed and is not pertinent to this patient's current complaint    Objective: Vital signs: Blood pressure 110/64 respirations 12 general this elderly lady is alert she is not in any acute distress.  She is  oriented ×3.  She does get some of the details of the events a little confused but generally is very well oriented.  Her back braces on and I did not remove this.  This time was spent in reviewing her history and discussing options for help.    Assessment: #1 fall -this does not appear to be a confused person but was rather fall based on accident.  2.  Burst fracture of L1  3.  Sacral fracture    Plan: #1 after discussion we decided to pursue home health for this lady.  I think this could give her and her children help that she needs.  One son is off of work now but may need to take FMLA to help take care of her.  This is her son great I believe who lives with her.  There is one other son in town and a daughter who flew here from out of town.  2.  Forms are filled out for disability parking certificate, lift chair, and hospital bed.  Copies are made for chart  3.  She is to follow-up with her neurosurgeon for what I assume will be a repeat back x-ray.  I did mention to them vertebroplasty and not knowing if she was a candidate for this.  Reviewed today- notes from the emergency room visit                               CT scan results showing a burst fracture and also the sacral fracture  Length of time spent was 45 minutes-greater than 50% of this time was spent in review of her history, review of medical chart, completion of forms.

## 2021-06-13 NOTE — PROGRESS NOTES
I saw Adebayo Roach for evaluation and measurement for a TLSO brace per Rx of Darlene Reyna MD. Wanda has been diagnosed with an L1 burst fracture. I took a series of 24 measurements for fabrication of a custom TLSO from [employer] Custom spinal orthosis will be available for fitting on 9/18/2017. Orthosis is to provide tri-planar control of the spine, limiting flexion/extension and transverse motion to promote healing.

## 2021-06-13 NOTE — PROGRESS NOTES
CHART NOTE     DATE OF SERVICE:  10/16/2017     : 10/28/1927   89 y.o.     ASSESSMENT :   Doing well, no pain, stable fxs.      PLAN: RTC 4 weeks with new xrays. Cont TLSO.     HPI:  Adebayo Roach is status post status post hospital consult by DR. CRAIN on 17 for  L1 BURST FRACTURE  Patient initially presented s/p fall and found to have L1 lburst fx with approximately 40%  height loss centrally and 6 mm of posterior displacement of the posterior superior cortex. Also referred to Ortho for sacral fx which required no treatment. Placed in TLSO for L1 fx stabilization. Also follow up with PCP regarding osteoporosis treatment if needed (Jamey), maintain vitamin D level of 45-50.    TODAY, Adebayo Roach is here today with her son Luis Fernando.  She reports no back pain, some discomfort from brace itself.  Can walk without pain.  No leg pain.  Ambulates independently without a walker.  Lives with one of her sons, Ranjeet.  Patient did follow up with Dr. Brennan but osteoporosis treatment not discussed.      PAST MEDICAL HISTORY, SURGICAL HISTORY, REVIEW OF SYMPTOMS, MEDICATIONS AND ALLERGIES:  Past medical history, surgical history, ROS, medications and allergies reviewed with patient and remain unchanged from previous visit.    Past Medical History:   Diagnosis Date     Backache without radiation 2017     Benign Essential Hypertension     Created by Conversion      Compression fracture of L1 lumbar vertebra 2017       PHYSICAL EXAM:    There were no vitals taken for this visit.    Neurological exam reveals:  Respirations easy, non-labored.   Skin: W/D/I. No rashes, lesions or breaks in integrity.   Recent and remote memory intact, fund of knowledge wnl.    Alert and oriented x3, speech fluent and appropriate.   PERRL, EOMI, No nystagmus,   Face symmetric, tongue midline, Uvula midline,  palate rises with phonation   Shoulder shrug equal  Arm strength bilateral grasp, biceps, triceps, and deltoids 5/5    Leg strength bilateral dorsiflexion, plantar flexion, and hip flexion 5/5  No extremity edema noted.   Can heel/toe walk, do toe rises and squats without difficulty.   Muscle Bulk and tone wnl.   Reflexes: No pathological reflexes   Gait and station: Not observed, in WC       RADIOGRAPHIC IMAGING: XR:  Stable L1 fx with min change in loss of height.  No new fx.   Films personally reviewed and interpreted.   Reviewed imaging with patient and family.

## 2021-06-14 NOTE — PROGRESS NOTES
CHART NOTE     DATE OF SERVICE:  12/15/2017     : 10/28/1927   89 y.o.     ASSESSMENT :   Doing well with improvement in symptoms and function.  Stable fx.      PLAN: Wean from collar/brace. Loosen restrictions.  RTC prn. Enc to call with any new questions or concerns.     HPI:  Adebayo Roach is status post hospital consult by DR. CRAIN on 17 for  L1 BURST FRACTURE  Patient initially presented s/p fall and found to have L1 lburst fx with approximately 40%  height loss centrally and 6 mm of posterior displacement of the posterior superior cortex. Also referred to Ortho for sacral fx which required no treatment. Placed in TLSO for L1 fx stabilization.     Last seen in clinic on 2017.  Patient denied back or leg pain. Walking has improved.  Wearing brace as instructed. Vit D management per Dr. Brennan. Fx stable on imaging. Plan RTC in 4 weeks with xrays. Anticipate wean brace and refer to PT.     TODAY, Adebayo Roach reports no back or leg pain.  No Walks with good tolerance.  Does not feel need for PT.      PAST MEDICAL HISTORY, SURGICAL HISTORY, REVIEW OF SYMPTOMS, MEDICATIONS AND ALLERGIES:  Past medical history, surgical history, ROS, medications and allergies reviewed with patient and remain unchanged from previous visit.    Past Medical History:   Diagnosis Date     Backache without radiation 2017     Benign Essential Hypertension     Created by Conversion      Compression fracture of L1 lumbar vertebra 2017     PHYSICAL EXAM:    /70  Pulse 66  Resp 14    Neurological exam reveals:  Respirations easy, non-labored.   Skin: W/D/I. No rashes, lesions or breaks in integrity.   Recent and remote memory intact, fund of knowledge wnl.    Alert and oriented x3, speech fluent and appropriate.   PERRL, EOMI, No nystagmus,   Face symmetric, tongue midline, Uvula midline,  palate rises with phonation   Shoulder shrug equal  Leg strength bilateral dorsiflexion, plantar flexion, and  hip flexion 5/5 to exam  No extremity edema noted.   Muscle Bulk and tone wnl.   Reflexes: No pathological reflexes   Gait and station:not observed, in WC  Incision: CDI without erythema or edema  NDI/MARY ANN: 6%     RADIOGRAPHIC IMAGING: XR:  Sl progression of height loss to 60% (from 50%), otherwise stable.   Films personally reviewed and interpreted.   Reviewed imaging with patient and family.

## 2021-06-14 NOTE — PROGRESS NOTES
CHART NOTE     DATE OF SERVICE:  2017     : 10/28/1927   89 y.o.     ASSESSMENT :   Doing well with improvement in symptoms and function.  Fx stable alignment with min progression in height loss. .      PLAN: RTC 4 weeks with new xrays. Anticipate wean from brace and referral to PT at that time.    HPI:  Adebayo Roach is status post consult by DR. CRAIN on 17 for  L1 BURST FRACTURE  Patient initially presented s/p fall and found to have L1 lburst fx with approximately 40%  height loss centrally and 6 mm of posterior displacement of the posterior superior cortex. Also referred to Ortho for sacral fx which required no treatment.  Seen in clinic on 10/16/017.  No back or leg pain, brace discomfort only.  Fx stable on imaging. Will RTC 4 weeks with new xrays.     TODAY, Adebayo Roach reports no back or leg pain. Walking has improved.  Has son staying with her. Wearing brace as instructed.  Spoke with Dr. Brennan's ofc. Vitamin D increased.      PAST MEDICAL HISTORY, SURGICAL HISTORY, REVIEW OF SYMPTOMS, MEDICATIONS AND ALLERGIES:  Past medical history, surgical history, ROS, medications and allergies reviewed with patient and remain unchanged from previous visit.    Past Medical History:   Diagnosis Date     Backache without radiation 2017     Benign Essential Hypertension     Created by Conversion      Compression fracture of L1 lumbar vertebra 2017       PHYSICAL EXAM:    BP (!) 188/96  Pulse 83  Resp 16  SpO2 99%      Neurological exam reveals:  Respirations easy, non-labored.   Skin: W/D/I. No rashes, lesions or breaks in integrity.   Recent and remote memory intact, fund of knowledge wnl.    Alert and oriented x3, speech fluent and appropriate.   PERRL, EOMI, No nystagmus,   Face symmetric, tongue midline, Uvula midline,  palate rises with phonation   Shoulder shrug equal  Leg strength bilateral dorsiflexion, plantar flexion, and hip flexion 5/5  No extremity edema noted.    Can heel/toe walk, do toe rises and squats without difficulty.   Muscle Bulk and tone wnl.   Reflexes: No pathological reflexes   Gait and station:Normal and brisk.      RADIOGRAPHIC IMAGING: Xray: Mild progression in height loss of L1 burst fx. Alignment stable.   Films personally reviewed and interpreted.   Reviewed imaging with patient and family.

## 2021-06-14 NOTE — TELEPHONE ENCOUNTER
1 month only She NEEDS to come in for labs at minimum, but it would also be helpful if she could come in for a follow up visits - please schedule per her wishes.  She should know the providers and nurses have been vaccinated for Covid19, we keep patient who are sick away from the clinic, and everyone is masked

## 2021-06-14 NOTE — TELEPHONE ENCOUNTER
RN cannot approve Refill Request    RN can NOT refill this medication Protocol failed and NO refill given. Last office visit: 7/2/2019 Randi Marques MD Last Physical: Visit date not found Last MTM visit: Visit date not found Last visit same specialty: 7/2/2019 Randi Marques MD.  Next visit within 3 mo: Visit date not found  Next physical within 3 mo: Visit date not found      Amisha Cary, [facility] Triage/Med Refill 2/2/2021    Requested Prescriptions   Pending Prescriptions Disp Refills     benazepril-hydrochlorthiazide (LOTENSIN HCT) 20-25 mg per tablet 90 tablet 1     Sig: Take 1 tablet by mouth daily.       Diuretics/Combination Diuretics Refill Protocol  Failed - 2/2/2021  2:32 PM        Failed - Serum Potassium in past 12 months      No results found for: LN-POTASSIUM          Failed - Serum Sodium in past 12 months      No results found for: LN-SODIUM          Failed - Blood pressure on file in past 12 months     BP Readings from Last 1 Encounters:   07/02/19 180/90             Failed - Serum Creatinine in past 12 months      Creatinine   Date Value Ref Range Status   07/02/2019 0.77 0.60 - 1.10 mg/dL Final             Passed - Visit with PCP or prescribing provider visit in past 12 months     Last office visit with prescriber/PCP: 7/2/2019 Randi Marques MD OR same dept: Visit date not found OR same specialty: 7/2/2019 Randi Marques MD  Last physical: Visit date not found Last MTM visit: Visit date not found   Next visit within 3 mo: Visit date not found  Next physical within 3 mo: Visit date not found  Prescriber OR PCP: Randi Marques MD  Last diagnosis associated with med order: 1. Benign Essential Hypertension  - benazepril-hydrochlorthiazide (LOTENSIN HCT) 20-25 mg per tablet; Take 1 tablet by mouth daily.  Dispense: 90 tablet; Refill: 1    If protocol passes may refill for 12 months if within 3 months of last provider visit (or a total of 15 months).

## 2021-06-15 NOTE — TELEPHONE ENCOUNTER
Call son to reminded of the 3/5 appointments    [phone]     Fern Ramirez CMA ([facility])

## 2021-06-15 NOTE — TELEPHONE ENCOUNTER
RN cannot approve Refill Request    RN can NOT refill this medication PCP messaged that patient is overdue for Labs. Last office visit: 7/2/2019 Randi Marques MD Last Physical: Visit date not found Last MTM visit: Visit date not found Last visit same specialty: 7/2/2019 Randi Marques MD.  Next visit within 3 mo: Visit date not found  Next physical within 3 mo: Visit date not found      Tracy Diaz, [facility] Triage/Med Refill 3/1/2021    Requested Prescriptions   Pending Prescriptions Disp Refills     benazepril-hydrochlorthiazide (LOTENSIN HCT) 20-25 mg per tablet [Pharmacy Med Name: BENAZEPRIL/HCTZ 20/25MG TABLETS] 30 tablet 0     Sig: TAKE 1 TABLET BY MOUTH DAILY       Diuretics/Combination Diuretics Refill Protocol  Failed - 3/1/2021  4:42 PM        Failed - Serum Potassium in past 12 months      No results found for: LN-POTASSIUM          Failed - Serum Sodium in past 12 months      No results found for: LN-SODIUM          Failed - Blood pressure on file in past 12 months     BP Readings from Last 1 Encounters:   07/02/19 180/90             Failed - Serum Creatinine in past 12 months      Creatinine   Date Value Ref Range Status   07/02/2019 0.77 0.60 - 1.10 mg/dL Final             Passed - Visit with PCP or prescribing provider visit in past 12 months     Last office visit with prescriber/PCP: 7/2/2019 Randi Marques MD OR same dept: Visit date not found OR same specialty: 7/2/2019 Randi Marques MD  Last physical: Visit date not found Last MTM visit: Visit date not found   Next visit within 3 mo: Visit date not found  Next physical within 3 mo: Visit date not found  Prescriber OR PCP: Randi Marques MD  Last diagnosis associated with med order: 1. Benign Essential Hypertension  - benazepril-hydrochlorthiazide (LOTENSIN HCT) 20-25 mg per tablet [Pharmacy Med Name: BENAZEPRIL/HCTZ 20/25MG TABLETS]; TAKE 1 TABLET BY MOUTH DAILY  Dispense: 30 tablet; Refill: 0    If protocol passes may refill for 12  months if within 3 months of last provider visit (or a total of 15 months).

## 2021-06-15 NOTE — TELEPHONE ENCOUNTER
Left voice mail for patient and sent her a [employer] message with Dr. Marques's message.     PAMELLA/VAISHALI

## 2021-06-15 NOTE — TELEPHONE ENCOUNTER
Spoke with Ranjeet, he will be at this appointment with her, then they will go get her COVID shot.    Fern Ramirez CMA ([facility])

## 2021-06-15 NOTE — PROGRESS NOTES
"Assessment and Plan    1. Benign Essential Hypertension  Well controlled on:   - benazepril-hydrochlorothiazide (LOTENSIN HCT) 20-25 mg per tablet; Take 1 tablet by mouth daily.  Dispense: 90 tablet; Refill: 3  - cloNIDine HCL (CATAPRES) 0.2 MG tablet; TAKE 1/2 TABLET BY MOUTH IN THE AFTERNOON AND 1 TABLET EVERY NIGHT AT BEDTIME  Dispense: 135 tablet; Refill: 3  Monitor with  - Comprehensive Metabolic Panel    2. Neck pain - bothers her about one day a month and makes her harder for her to do some household things - walk up and down stairs, get things down from cabinets, lift things.  Resolves tylenol    3. Irregular heart beat  Asymptomatic, no time today to stay for EKG  No history of chest pain, no palpitations noted, no dyspnea    Return in about 4 days (around 3/9/2021) for follow up.       Randi Marques MD     -------------------------------------------    Chief Complaint   Patient presents with     Medication Management     Paperwork     FMLA      I haven't seen Adebayo in over a year.  She is doing well, has a good appetite, has not had a fall since 2017, is active in her home.  She comes with her son Ranjeet today - they live together. Adebayo has her first Covid19 vaccine scheduled just after this visit      ; Ranjeet needs to have FMLA for when  times when he needs to help her - only about once a month.  HE also helps housecleaning    Her only complaint is neck pain , which  periodically slows her down - makes it hard tot get things out of cabinets or lift anything heavy (like milk gallon) - however she takes some tylenol when this happens and it usually resolves in a few hours.  She calls this \"arthritis\" however she has not had any xray's done    She DOES have a history of lumbar compression fracture, and evaluation showed degenerative changes of hips and SI joints, so cervical spine arthritis is certainly a good possibility:    INDICATION: Compression fracture of L1 lumbar vertebra.  COMPARISON: Lumbar spine " radiograph 11/13/2017, 10/16/2017, lumbar spine CT 9/16/2017     FINDINGS: 5 lumbar type vertebrae. Diffusely demineralized bones. 60-70% burst type compression fracture of L1, appears slightly progressed since previous lumbar spine radiograph. Stable mild retropulsion of the posterior cortex of L1 into the spinal   canal. Stable 15 degrees of focal kyphosis from T12 to L2 the rest of the lumbar spine vertebral body heights are maintained. Stable 1 mm degenerative spondylolisthesis of L4 on L5. 1 to 2 mm retrolisthesis of L2 on L3 and L3 on L4. Mild levocurvature of   the thoracolumbar junction. No pars defects. Moderate intervertebral disc height loss at L5-S1. Mild facet arthropathy at L5-S1. Mild to moderate degenerative changes of the sacroiliac joints and hips.    Hypertension -they take at [facility] and the readings average around/140.70     -no cheat pains or palpitations   - no trouble breathing   -  gets runny nose in the am        Wt Readings from Last 3 Encounters:   03/05/21 115 lb 11.2 oz (52.5 kg)   07/02/19 114 lb 1.9 oz (51.8 kg)   07/06/18 112 lb (50.8 kg)         Current Outpatient Medications on File Prior to Visit   Medication Sig Dispense Refill     cholecalciferol, vitamin D3, (D3-2000) 2,000 unit capsule Take 4,000 Units by mouth daily.       hypromellose (ARTIFICIAL TEARS,[id number],) Drop Administer 1 drop to both eyes 2 (two) times a day.       ibuprofen (ADVIL,MOTRIN) 200 MG tablet Take 200 mg by mouth every 6 (six) hours as needed for pain.       [DISCONTINUED] benazepril-hydrochlorthiazide (LOTENSIN HCT) 20-25 mg per tablet TAKE 1 TABLET BY MOUTH DAILY 30 tablet 0     [DISCONTINUED] cloNIDine HCL (CATAPRES) 0.2 MG tablet TAKE 1/2 TABLET BY MOUTH IN THE AFTERNOON AND 1 TABLET EVERY NIGHT AT BEDTIME 135 tablet 3     aspirin 81 MG EC tablet Take 81 mg by mouth daily as needed for pain.        No current facility-administered medications on file prior to visit.        The history section was  last reviewed by Antonio Ryan, CMA on Mar 5, 2021.    Social History     Tobacco Use   Smoking Status Never Smoker   Smokeless Tobacco Never Used       Social History     Substance and Sexual Activity   Alcohol Use Yes    Comment: 1 glass of red wine with dinner.         Vitals:    03/05/21 1333   BP: 152/80   Temp: 98.6  F (37  C)     Body mass index is 21.86 kg/m .     EXAM:    General appearance - alert, well appearing, and in no distress  Mental status - normal mood, behavior, speech, dress, motor activity, and thought processes  Ears - bilateral TM's and external ear canals normal, hard of hearing - has hearing aids in  Mouth - mucous membranes moist, pharynx normal without lesions  Neck - supple, no significant adenopathy  Chest - clear to auscultation, no wheezes, rales or rhonchi, symmetric air entry  Heart - S1 and S2 normal, irregularly irregular rhythm with rate ?, no murmur  Abdomen - soft, nontender, nondistended, no masses or organomegaly  Extremities - peripheral pulses normal, no pedal edema, no clubbing or cyanosis

## 2021-06-15 NOTE — TELEPHONE ENCOUNTER
"I have been through all my papers and have not seen this.  Furthermore, I would need to have  visit (this could be virtual) with the two of them to fill out FMLA papers -  I always request this as it is very important to have everyone on the same page regarding how the papers are filled out. Please let son know    Lastly, I have not seen her in over a year, and she is overdue  For labs, so please have her come in for an in person Annual Wellness Visit, or at minimum follow up visit    She has already been contacted (below) , but please call to let her know, and son as well:    Daron Fiore,     Just left you a voice mail informing you that Dr. Marques has approved your prescription for   benazepril-hydrochlorthiazide (LOTENSIN HCT) 20-25 mg per tablet.     Dr. Marques only approved a 1 month supply Per Dr. Marques -\"She NEEDS to come in for labs at minimum, but it would also be helpful if she could come in for a follow up visits - please schedule per her wishes.  She should know the providers and nurses have been vaccinated for Covid19, we keep patient who are sick away from the clinic, and everyone is masked\"        Please give us a call to schedule your appointment. Dr. Marques is now located at the [facility].      Thank you,  Latoya/VAISHALI     [address] [facility] [id number] [phone].       "

## 2021-06-15 NOTE — TELEPHONE ENCOUNTER
Reason for Call:  Other call back      Detailed comments: HUSSEIN GAMBINO CALLING TO SEE IF LA PAPERWORK THAT WAS FAXED IN ON 02/15/2021 HAS BEEN COMPLETED AND FAXED TO HIS EMPLOYER    PLEASE ADVISE - CALL HUSSEIN GAMBINO FOR FURTHER DETAILS    Phone Number Patient can be reached at: Other phone number:  [phone]    Best Time: ANYTIME    Can we leave a detailed message on this number?: Yes    Call taken on 2/23/2021 at 4:05 PM by Wanda Kate

## 2021-06-15 NOTE — PROGRESS NOTES
Assessment and Plan    1. Irregular heart beat  - Electrocardiogram Perform and Read    Atrial flutter with rapid ventricular response with premature ventricular or aberrantly conducted complexes   Nonspecific ST abnormality   Abnormal ECG   When compared with ECG of 17-OCT-2008 21:28,   Atrial flutter has replaced Sinus rhythm   Vent. rate has increased BY  76 BPM     2. Atrial flutter with rapid ventricular response (H)  Note on exam previously irregularly irregular - may be Afib/aflutter with flutter only found on EKG  Asymptomatic - found only on exam - no complaints.  Consulted with cardiology to confirm necessity only for addressing rate and stroke risk  - metoprolol succinate (TOPROL XL) 50 MG 24 hr tablet; Take 1 tablet (50 mg total) by mouth daily.  Dispense: 60 tablet; Refill: 5  - apixaban ANTICOAGULANT (ELIQUIS) 2.5 mg Tab tablet; Take 1 tablet (2.5 mg total) by mouth 2 (two) times a day.  Dispense: 60 tablet; Refill: 5        Return in 1 week (on 3/19/2021).  Re-assess rate    Randi Marques MD     -------------------------------------------    Chief Complaint   Patient presents with     Follow-up     heart      Natalia comes today accompanied by her oldest son.  I saw her last week for a follow up and she and her younger son had to get her to her appt for her second Covid19 vaccine before I could do and EKG to work up her irregular heartbeat.  Only other medical issues are hypertension and musculoskeletal pain    Wt Readings from Last 3 Encounters:   03/05/21 115 lb 11.2 oz (52.5 kg)   07/02/19 114 lb 1.9 oz (51.8 kg)   07/06/18 112 lb (50.8 kg)       She maintains she feels fine, no dizziness, no chest pain, no dyspnea - gets around fine at home    Assuming Atrial fibrillation on exam, below is Chads2 scoring  Score is 4 - 1 point female 2 point >75, 1 point hypertension    CHADS2 Scoring:  No data recorded     A result of 1 equals an intermediate risk of thromboembolic event: 2.8% risk of event per  year if no coumadin.  A result of 2 equals an intermediate risk of thromboembolic event: 4.0% risk of event per year if no coumadin.  A result of 3 equals a high risk of thromboembolic event: 5.9% risk of event per year if no coumadin.  A result of 4 equals a high risk of thromboembolic event: 8.5% risk of event per year if no coumadin.  A result of 5 equals a high risk of thromboembolic event: 12.5% risk of event per year if no coumadin.  A result of 6 equals a high risk of thromboembolic event: 18.2% risk of event per year if no coumadin.      Current Outpatient Medications on File Prior to Visit   Medication Sig Dispense Refill     aspirin 81 MG EC tablet Take 81 mg by mouth daily as needed for pain.        benazepril-hydrochlorthiazide (LOTENSIN HCT) 20-25 mg per tablet Take 1 tablet by mouth daily. 90 tablet 3     cholecalciferol, vitamin D3, (D3-2000) 2,000 unit capsule Take 4,000 Units by mouth daily.       cloNIDine HCL (CATAPRES) 0.2 MG tablet TAKE 1/2 TABLET BY MOUTH IN THE AFTERNOON AND 1 TABLET EVERY NIGHT AT BEDTIME 135 tablet 3     hypromellose (ARTIFICIAL TEARS,[id number],) Drop Administer 1 drop to both eyes 2 (two) times a day.       ibuprofen (ADVIL,MOTRIN) 200 MG tablet Take 200 mg by mouth every 6 (six) hours as needed for pain.       No current facility-administered medications on file prior to visit.      The history section was last reviewed by Antonio Ryan CMA on Mar 12, 2021.    Social History     Tobacco Use   Smoking Status Never Smoker   Smokeless Tobacco Never Used       Social History     Substance and Sexual Activity   Alcohol Use Yes    Comment: 1 glass of red wine with dinner.         Vitals:    03/12/21 1513   BP: 136/64   Pulse: 86   Temp: 98  F (36.7  C)   SpO2: 94%     There is no height or weight on file to calculate BMI.     EXAM:    General appearance - alert, well appearing, and in no distress  Mental status - normal mood, behavior, speech, dress, motor activity, and  thought processes  Heart: irregularly irregular beat with tachycardia

## 2021-06-15 NOTE — TELEPHONE ENCOUNTER
Form was found and is now on Dr Marques deseliza, she would like son David to come in with her on 3/5 so we can get the form filled out right    Fern Ramirez CMA ([facility])

## 2021-06-16 PROBLEM — S32.010A COMPRESSION FRACTURE OF L1 LUMBAR VERTEBRA (H): Status: ACTIVE | Noted: 2017-09-16

## 2021-06-16 PROBLEM — M54.2 NECK PAIN: Status: ACTIVE | Noted: 2021-03-06

## 2021-06-16 NOTE — PROGRESS NOTES
"Assessment and Plan    1. Atrial flutter with rapid ventricular response (H)  Not present today  Discussion with Adebayo and her son that it is not clear why this happened, how long it has been going on, and whether she is normal now \"on her own\" or because of metoprolol (I assume the latter, as it can be therapeutic)   We agree that doing a further work up given her age and absence of symptoms is unlikely to change how we address this.   Today's EKG:     Sinus rhythm with occasional Premature ventricular complexes   Possible Left atrial enlargement   Borderline ECG   When compared with ECG of 12-MAR-2021 15:32,   Sinus rhythm has replaced Atrial flutter   Vent. rate has decreased BY  75 BPM   Confirmed by SHAHZAD BROWNING, JENNA LOC:WW ([id number]) on 3/19/2021 4:42:28 PM     2. Sensorineural hearing loss (SNHL) of both ears  Hearing aids to not work well - have to shout even when she is wearing them.  Son appropriately requests:  - Ambulatory referral to Audiology      Return in about 6 months (around 9/19/2021).    Randi Marques MD     -------------------------------------------    Chief Complaint   Patient presents with     Follow-up     1 wk f/u      Adebayo comes for a follow up visit.  I had started her on metoprolol and apixaban for atrial flutter last week.  She never had any symptoms or preceding illness.  It is hard to know how long this has been going on: I first noted FAST irregular heart beat (and suspected atrial fibrillation at visit 3/5/2019 but she did not have time for EKG then, so came back last week when EKG showed atrial flutter. Her last visit prior to that was 7/2/19 - at which time her heart rate was normal.    She has no symptoms of thyroid disease (screening for thyroid disease with TSH, and TSH in work up of atrial flutter, is not covered by Medicare)   She says she feels fine today and has noticed no change with the addition of her two new medications.            Current Outpatient Medications on " File Prior to Visit   Medication Sig Dispense Refill     aspirin 81 MG EC tablet Take 81 mg by mouth daily as needed for pain.        benazepril-hydrochlorthiazide (LOTENSIN HCT) 20-25 mg per tablet Take 1 tablet by mouth daily. 90 tablet 3     cholecalciferol, vitamin D3, (D3-2000) 2,000 unit capsule Take 4,000 Units by mouth daily.       cloNIDine HCL (CATAPRES) 0.2 MG tablet TAKE 1/2 TABLET BY MOUTH IN THE AFTERNOON AND 1 TABLET EVERY NIGHT AT BEDTIME 135 tablet 3     hypromellose (ARTIFICIAL TEARS,[id number],) Drop Administer 1 drop to both eyes 2 (two) times a day.       ibuprofen (ADVIL,MOTRIN) 200 MG tablet Take 200 mg by mouth every 6 (six) hours as needed for pain.       No current facility-administered medications on file prior to visit.      The history section was last reviewed by Antonio Ryan CMA on Mar 19, 2021.    Social History     Tobacco Use   Smoking Status Never Smoker   Smokeless Tobacco Never Used       Social History     Substance and Sexual Activity   Alcohol Use Yes    Comment: 1 glass of red wine with dinner.         Vitals:    03/19/21 1431   BP: 148/84   Pulse: 82   Temp: 97.6  F (36.4  C)   SpO2: 98%     Body mass index is 22.54 kg/m .     EXAM:    General appearance - alert, well appearing, and in no distress  Mental status - normal mood, behavior, speech, dress, motor activity, and thought processes  Chest - clear to auscultation, no wheezes, rales or rhonchi, symmetric air entry  Heart - normal rate, regular rhythm, normal S1, S2, no murmurs, rubs, clicks or gallops  Extremities - no pedal edema noted

## 2021-06-18 NOTE — PATIENT INSTRUCTIONS - HE
Patient Instructions by Randi Marques MD at 3/12/2021  3:00 PM     Author: Randi Marques MD Service: -- Author Type: Physician    Filed: 3/12/2021  8:23 PM Encounter Date: 3/12/2021 Status: Addendum    : Randi Marques MD (Physician)    Related Notes: Original Note by Randi Marques MD (Physician) filed at 3/12/2021  4:06 PM

## 2021-06-19 NOTE — PROGRESS NOTES
"Assessment and Plan  Well 89 year old.  Takes daily aspirin and I shared that I do not see a reason for her to continue this - no heart history, diabetes or history of stroke, only risk factor is hypertension.  She was skeptical that she should quit - I will order an LDL    1. Benign Essential Hypertension  Controlled per her history - not always under control per our records  - Basic Metabolic Panel    2. White coat syndrome with hypertension  She says she has that and that explains her high values today - she says normal when measure elsewhere    3. Low hemoglobin- recheck.  9/18/2017   11.5 (L)     4. Vitamin D deficiency  She takes a daily supplement - validate value is not too high or low  - Vitamin D, Total (25-Hydroxy)    5. Screening, lipid  - LDL Cholesterol, Direct    Return in about 6 months (around 1/6/2019).    Randi Marques MD     -------------------------------------------    Chief Complaint   Patient presents with     [facility]     Natalia is a healthy 89 year old former patient of Dr Brennan who comes to [facility] with me    She lives in a big house, does all the cleaning and they have a lot of company.  She has a good appetite and she is eating well. She sees her eye doctor regularly and only needs reading glasses.  She has not pains or physical limitations to speak of, only noting arthritis in her neck that only bothers her if she's been reading. She also has a history of \"cracking tailbone\" vacuuming stairs - though she was on the bottom step and had one more - it wasn't that painful and she recovered    Blood pressure - she is clear about telling me she has white coat syndrome and when she gets her blood pressure checked NOT at the doctor's office she is fine    BP Readings from Last 3 Encounters:   07/06/18 (!) 152/110   12/15/17 112/70   11/13/17 (!) 188/96     I reviewed her medications and she does take aspirin.  She has to other risk factors for cardiac disease   - I said it was " "probably not necessary to take this, she asked why not, and I mentioned increased risk of bleeding.  She still was unconvinced  - I will order an LDL today    On the subject of risk of bleeding, I note that her hemoglobin was low on her last check- she was not aware of this  Component      Latest Ref Rng & Units 5/25/2011 9/18/2017   Hemoglobin      12.0 - 16.0 g/dL 13.2 11.5 (L)     She has a history of vitamin D deficiency and would like that rechecked    I ask about a living will and she is very clear \"I handed my living will to her person at the .\" I apologized but said we didn't have it on file, and she says \"I have  5 children who know what I want.\"        Patient Active Problem List   Diagnosis     Osteoarthritis     Abnormal Blood Chemistry     Benign Essential Hypertension     Reactive Hypertension     Compression fracture of L1 lumbar vertebra (H)         Current Outpatient Prescriptions:      cholecalciferol, vitamin D3, (D3-2000) 2,000 unit capsule, Take 4,000 Units by mouth daily., Disp: , Rfl:      aspirin 81 MG EC tablet, Take 81 mg by mouth daily as needed for pain. , Disp: , Rfl:      benazepril-hydrochlorthiazide (LOTENSIN HCT) 20-25 mg per tablet, Take 1 tablet by mouth daily., Disp: 90 tablet, Rfl: 3     cloNIDine HCl (CATAPRES) 0.2 MG tablet, Clonidine 0.2 mg take 1/2 tablet PO at noon and 1 tablet PO in the evening, Disp: 30 tablet, Rfl: 0     cloNIDine HCl (CATAPRES) 0.2 MG tablet, TAKE 1 TABLET BY MOUTH EVERY DAY, Disp: 90 tablet, Rfl: 0     hypromellose (ARTIFICIAL TEARS,[id number],) Drop, Administer 1 drop to both eyes 2 (two) times a day., Disp: , Rfl:      ibuprofen (ADVIL,MOTRIN) 200 MG tablet, Take 200 mg by mouth every 6 (six) hours as needed for pain., Disp: , Rfl:         There are no preventive care reminders to display for this patient.  Health Maintenance reviewed - .    History   Smoking Status     Never Smoker   Smokeless Tobacco     Never Used       History "   Alcohol Use     Yes     Comment: 1 glass of red wine with dinner.       Review of Systems -  no chest pains, palpitations or dyspnea     Vitals:    07/06/18 1636   BP: (!) 152/110   Pulse: 86   SpO2: 98%     Body mass index is 21.16 kg/(m^2).     EXAM:    General appearance - alert, well appearing, and in no distress  Mental status - alert, oriented to person, place, and time, normal mood, behavior, speech, dress, motor activity, and thought processes  Ears - bilateral TM's and external ear canals normal  Mouth - mucous membranes moist, pharynx normal without lesions  Neck - supple, no significant adenopathy, carotids upstroke normal bilaterally, no bruits, thyroid exam: thyroid is normal in size without nodules or tenderness  Chest - clear to auscultation, no wheezes, rales or rhonchi, symmetric air entry  Heart - normal rate, regular rhythm, normal S1, S2, no murmurs, rubs, clicks or gallops  Abdomen - soft, nontender, nondistended, no masses or organomegaly

## 2021-06-19 NOTE — LETTER
Letter by Randi Marques MD at      Author: Randi Marques MD Service: -- Author Type: --    Filed:  Encounter Date: 7/7/2019 Status: (Other)         Adebayo Roach  [address] [address]             July 7, 2019         Dear Ms. Doc,    Below are the results from your recent visit: Your test for kidney function and electrolytes is normal    Resulted Orders   Basic Metabolic Panel   Result Value Ref Range    Sodium 136 136 - 145 mmol/L    Potassium 4.0 3.5 - 5.0 mmol/L    Chloride 98 98 - 107 mmol/L    CO2 28 22 - 31 mmol/L    Anion Gap, Calculation 10 5 - 18 mmol/L    Glucose 93 70 - 125 mg/dL    Calcium 10.0 8.5 - 10.5 mg/dL    BUN 25 8 - 28 mg/dL    Creatinine 0.77 0.60 - 1.10 mg/dL    GFR MDRD Af Amer >60 >60 mL/min/1.73m2    GFR MDRD Non Af Amer >60 >60 mL/min/1.73m2    Narrative    Fasting Glucose reference range is 70-99 mg/dL per  American Diabetes Association (ADA) guidelines.     Please call with questions or contact us using [facility].    Sincerely,    Electronically signed by Randi Marques MD

## 2021-06-24 NOTE — TELEPHONE ENCOUNTER
Refill Approved    Rx renewed per Medication Renewal Policy. Medication was last renewed on 11/14/18.    Amisha Cary, [facility] Connection Triage/Med Refill 2/14/2019     Requested Prescriptions   Pending Prescriptions Disp Refills     benazepril-hydrochlorthiazide (LOTENSIN HCT) 20-25 mg per tablet 90 tablet 0     Sig: Take 1 tablet by mouth daily.    Diuretics/Combination Diuretics Refill Protocol  Passed - 2/14/2019  3:37 PM       Passed - Visit with PCP or prescribing provider visit in past 12 months    Last office visit with prescriber/PCP: 7/6/2018 Randi Marques MD OR same dept: 7/6/2018 Randi Marques MD OR same specialty: 7/6/2018 Randi Marques MD  Last physical: Visit date not found Last MTM visit: Visit date not found   Next visit within 3 mo: Visit date not found  Next physical within 3 mo: Visit date not found  Prescriber OR PCP: Randi Marques MD  Last diagnosis associated with med order: 1. Benign Essential Hypertension  - benazepril-hydrochlorthiazide (LOTENSIN HCT) 20-25 mg per tablet; Take 1 tablet by mouth daily.  Dispense: 90 tablet; Refill: 0    If protocol passes may refill for 12 months if within 3 months of last provider visit (or a total of 15 months).            Passed - Serum Potassium in past 12 months     Lab Results   Component Value Date    Potassium 4.0 07/06/2018            Passed - Serum Sodium in past 12 months     Lab Results   Component Value Date    Sodium 134 (L) 07/06/2018            Passed - Blood pressure on file in past 12 months    BP Readings from Last 1 Encounters:   07/06/18 (!) 152/110            Passed - Serum Creatinine in past 12 months     Creatinine   Date Value Ref Range Status   07/06/2018 0.72 0.60 - 1.10 mg/dL Final

## 2021-06-25 NOTE — TELEPHONE ENCOUNTER
She was up in the night to go to the bathroom  Had a sharp pain in upper back    Sharp pain up by neck off to the right and by shoulder blade    Folding clothes yesterday afternoon     Gave her 2 ibuprofen     Comes and goes  Stabbing     Patient states if feels like a pulled muscle    Triaged to a disposition of Home Care. Will monitor, if worsens will call back. Son is agreeable.    COVID 19 Nurse Triage Plan/Patient Instructions    Please be aware that novel coronavirus (COVID-19) may be circulating in the community. If you develop symptoms such as fever, cough, or SOB or if you have concerns about the presence of another infection including coronavirus (COVID-19), please contact your health care provider or visit  [email or web address].    Disposition/Instructions    Home care recommended. Follow home care protocol based instructions.    Thank you for taking steps to prevent the spread of this virus.  o Limit your contact with others.  o Wear a simple mask to cover your cough.  o Wash your hands well and often.    Resources    [facility]: About COVID-19: [email or web address]    CDC: What to Do If You're Sick: www.cdc.gov/coronavirus/2019-ncov/about/steps-when-sick.html    CDC: Ending Home Isolation: www.cdc.gov/coronavirus/2019-ncov/hcp/disposition-in-home-patients.html     CDC: Caring for Someone: www.cdc.gov/coronavirus/2019-ncov/if-you-are-sick/care-for-someone.html     [facility]: Interim Guidance for Hospital Discharge to Home: www.health.[facility].mn.us/diseases/coronavirus/hcp/hospdischarge.pdf    [facility] clinical trials (COVID-19 research studies): clinicalaffairs.[facility]/umn-clinical-trials     Below are the COVID-19 hotlines at the [facility] ([facility]). Interpreters are available.   o For health questions: Call 149-070-3079 or 1-351.957.2721 (7 a.m. to 7 p.m.)  o For questions about schools and childcare: Call 430-278-8290 or 1-483.199.6826 (7 a.m. to 7  p.m.)     Reason for Disposition    Caused by a twisting, bending, or lifting injury    Additional Information    Negative: Passed out (i.e., lost consciousness, collapsed and was not responding)    Negative: Shock suspected (e.g., cold/pale/clammy skin, too weak to stand, low BP, rapid pulse)    Negative: Sounds like a life-threatening emergency to the triager    Negative: Major injury to the back (e.g., MVA, fall > 10 feet or 3 meters, penetrating injury, etc.)    Negative: Followed a tailbone injury    Negative: [1] Pain in the upper back over the ribs (rib cage) AND [2] radiates (travels, goes) into chest    Negative: [1] Pain in the upper back over the ribs (rib cage) AND [2] worsened by coughing (or clearly increases with breathing)    Negative: Back pain during pregnancy    Negative: Pain mainly in flank (i.e., in the side, over the lower ribs or just below the ribs)    Negative: [1] SEVERE back pain (e.g., excruciating) AND [2] sudden onset AND [3] age > 60    Negative: [1] Unable to urinate (or only a few drops) > 4 hours AND [2] bladder feels very full (e.g., palpable bladder or strong urge to urinate)    Negative: [1] Loss of bladder or bowel control (urine or bowel incontinence; wetting self, leaking stool) AND [2] new onset    Negative: Numbness in groin or rectal area (i.e., loss of sensation)    Negative: [1] SEVERE abdominal pain AND [2] present > 1 hour    Negative: [1] Abdominal pain AND [2] age > 60    Negative: Weakness of a leg or foot (e.g., unable to bear weight, dragging foot)    Negative: Unable to walk    Negative: Patient sounds very sick or weak to the triager    Negative: [1] SEVERE back pain (e.g., excruciating, unable to do any normal activities) AND [2] not improved 2 hours after pain medicine    Negative: [1] Pain radiates into the thigh or further down the leg AND [2] both legs    Negative: [1] Fever > 100.0 F (37.8 C) AND [2] flank pain (i.e., in side, below ribs and above hip)     "Negative: [1] Pain or burning with passing urine (urination) AND [2] flank pain (i.e., in side, below ribs and above hip)    Negative: Numbness in a leg or foot (i.e., loss of sensation)    Negative: [1] Numbness in an arm or hand (i.e., loss of sensation) AND [2] upper back pain    Negative: High-risk adult (e.g., history of cancer, HIV, or IV drug abuse)    Negative: [1] Fever AND [2] no symptoms of UTI  (Exception: has generalized muscle pains, not localized back pain)    Negative: Rash in same area as pain (may be described as \"small blisters\")    Negative: Blood in urine (red, pink, or tea-colored)    Negative: [1] MODERATE back pain (e.g., interferes with normal activities) AND [2] present > 3 days    Negative: [1] Pain radiates into the thigh or further down the leg AND [2] one leg    Negative: [1] Age > 50 AND [2] no history of prior similar back pain    Negative: Back pain present > 2 weeks    Negative: Back pain is a chronic symptom (recurrent or ongoing AND present > 4 weeks)    Protocols used: BACK PAIN-A-    Blanca Mazariegos RN Triage Nurse Advisor      "

## 2021-06-27 ENCOUNTER — HEALTH MAINTENANCE LETTER (OUTPATIENT)
Age: 86
End: 2021-06-27

## 2021-06-29 ENCOUNTER — RECORDS - HEALTHEAST (OUTPATIENT)
Dept: ADMINISTRATIVE | Facility: OTHER | Age: 86
End: 2021-06-29

## 2021-06-30 ENCOUNTER — TELEPHONE (OUTPATIENT)
Dept: GERIATRICS | Facility: CLINIC | Age: 86
End: 2021-06-30

## 2021-07-01 NOTE — TELEPHONE ENCOUNTER
[facility] Nurse Triage Telephone Note    Provider: FRANCHESCA Guidry CNP   Facility: [facility] Facility Type:  TCU    Caller: Caryl  Call Back Number: [phone]    Allergies:    Allergies   Allergen Reactions     Alendronate [Alendronic Acid] Unknown        Reason for call: NA yesterday H/O stroke, recent Aspiration pneumonia, Seizures, Afib. BP this am 88/74 119, enc fluids (on honey thick liquids) held am Metoprolol 75mg BID & Lotensen 20mg every day. Recheck 74/52 109 RA sat 96%. Ate breakfast. Recheck 86/65. Was up for therapy & lunch, son into visit. 1255 recheck /81 P:62.    Verbal Order/Direction given by Provider: NNO at this time until I review chart & history.    Provider giving Order:  FRANCHESCA Guidry CNP     Verbal Order given to: Caryl Stone RN

## 2021-07-03 NOTE — ADDENDUM NOTE
Addendum Note by Isela Amaya MD at 12/18/2018  6:20 PM     Author: Isela Amaya MD Service: -- Author Type: Physician    Filed: 12/18/2018  6:20 PM Encounter Date: 12/15/2018 Status: Signed    : Isela Amaya MD (Physician)    Addended by: ISELA AMAYA on: 12/18/2018 06:20 PM        Modules accepted: Orders

## 2021-07-05 ENCOUNTER — AMBULATORY - HEALTHEAST (OUTPATIENT)
Dept: GERIATRICS | Facility: CLINIC | Age: 86
End: 2021-07-05

## 2021-07-05 RX ORDER — TRAZODONE HYDROCHLORIDE 50 MG/1
50 TABLET, FILM COATED ORAL AT BEDTIME
Status: SHIPPED | COMMUNITY
Start: 2021-07-05 | End: 2021-07-16

## 2021-07-05 RX ORDER — LEVETIRACETAM 100 MG/ML
250 SOLUTION ORAL 2 TIMES DAILY
Status: SHIPPED | COMMUNITY
Start: 2021-07-05 | End: 2021-07-16

## 2021-07-05 RX ORDER — METOPROLOL TARTRATE 25 MG/1
50 TABLET, FILM COATED ORAL 2 TIMES DAILY
Status: SHIPPED | COMMUNITY
Start: 2021-07-05

## 2021-07-05 RX ORDER — MICONAZOLE NITRATE 20 MG/G
1 CREAM TOPICAL 2 TIMES DAILY
Status: SHIPPED | COMMUNITY
Start: 2021-07-05

## 2021-07-05 RX ORDER — POLYETHYLENE GLYCOL 400 AND PROPYLENE GLYCOL 4; 3 MG/ML; MG/ML
1 SOLUTION/ DROPS OPHTHALMIC 2 TIMES DAILY
Status: SHIPPED | COMMUNITY
Start: 2021-07-05

## 2021-07-05 RX ORDER — BENAZEPRIL HYDROCHLORIDE 20 MG/1
20 TABLET ORAL DAILY
Status: SHIPPED | COMMUNITY
Start: 2021-07-05

## 2021-07-07 ENCOUNTER — COMMUNICATION - HEALTHEAST (OUTPATIENT)
Dept: GERIATRICS | Facility: CLINIC | Age: 86
End: 2021-07-07

## 2021-07-07 ENCOUNTER — RECORDS - HEALTHEAST (OUTPATIENT)
Dept: LAB | Facility: CLINIC | Age: 86
End: 2021-07-07

## 2021-07-07 NOTE — TELEPHONE ENCOUNTER
Telephone Encounter by Renetta Cochran RN at 7/7/2021  3:57 PM     Author: Renetta Cochran RN Service: -- Author Type: Registered Nurse    Filed: 7/7/2021  4:03 PM Encounter Date: 7/7/2021 Status: Signed    : Renetta Cochran RN (Registered Nurse)       [facility] Nurse Triage Telephone Note      Provider: Tutu Brown DO  Facility: [facility]    Facility Type: TCU    Caller: Vinny  Call Back Number:  [phone]    Allergies: Alendronate    Reason for call:   Pt's o2 sats in the am was los 90's this afternoon was mid to low 80's and now the pt's O2 sat is 98% on 2L o2. Vitals: BP:  124/66  P:: 88  R:: 20  T 97.9   LS course, productive cough, Pt has dementia and unable to do the IS effectively.  Pt has a hx of Aspiration pneumonia not on any abx at this time.      Verbal Order/Direction given by Provider: Chest Xray 2 views, Start doxycycline 100mg two times a day x7days, WBC lab tomorrow 7/8. O2 1-4lpm to keep O2 sats >90%.    Provider giving order: Tutu Brown DO    Verbal order given to: Rosanna Cochran RN

## 2021-07-08 ENCOUNTER — COMMUNICATION - HEALTHEAST (OUTPATIENT)
Dept: GERIATRICS | Facility: CLINIC | Age: 86
End: 2021-07-08

## 2021-07-08 LAB — WBC: 11.3 THOU/UL (ref 4–11)

## 2021-07-08 NOTE — TELEPHONE ENCOUNTER
Telephone Encounter by Renetta Cochran RN at 7/8/2021  3:26 PM     Author: Renetta Cochran RN Service: -- Author Type: Registered Nurse    Filed: 7/8/2021  3:32 PM Encounter Date: 7/8/2021 Status: Signed    : Renetta Cochran RN (Registered Nurse)       [facility] Nurse Triage Telephone Note      Provider: Tutu Brown DO  Facility: [facility]    Facility Type: TCU    Caller: Rosanna   Call Back Number:  [phone]    Allergies: Alendronate    Reason for call: Chest Xray d/t decreased saturations. Pt currently on doxycycline 100mg two times a day x 7days. Per nursing pt is doing well that this time.        Verbal Order/Direction given by Provider: NNO, pt currently on abx at this time.     Provider giving order: Tutu Brown DO    Verbal order given to: Rosanna Cochran RN

## 2021-07-13 VITALS
DIASTOLIC BLOOD PRESSURE: 66 MMHG | WEIGHT: 121.9 LBS | SYSTOLIC BLOOD PRESSURE: 104 MMHG | OXYGEN SATURATION: 94 % | TEMPERATURE: 97.6 F | BODY MASS INDEX: 23.93 KG/M2 | HEIGHT: 60 IN | RESPIRATION RATE: 18 BRPM | HEART RATE: 94 BPM

## 2021-07-13 ASSESSMENT — MIFFLIN-ST. JEOR: SCORE: 879.43

## 2021-07-14 ENCOUNTER — LAB REQUISITION (OUTPATIENT)
Dept: LAB | Facility: CLINIC | Age: 86
End: 2021-07-14
Payer: COMMERCIAL

## 2021-07-14 ENCOUNTER — TRANSITIONAL CARE UNIT VISIT (OUTPATIENT)
Dept: GERIATRICS | Facility: CLINIC | Age: 86
End: 2021-07-14
Payer: COMMERCIAL

## 2021-07-14 DIAGNOSIS — I48.91 ATRIAL FIBRILLATION WITH RAPID VENTRICULAR RESPONSE (H): ICD-10-CM

## 2021-07-14 DIAGNOSIS — J69.0 ASPIRATION PNEUMONIA, UNSPECIFIED ASPIRATION PNEUMONIA TYPE, UNSPECIFIED LATERALITY, UNSPECIFIED PART OF LUNG (H): ICD-10-CM

## 2021-07-14 DIAGNOSIS — R41.82 ALTERED MENTAL STATUS, UNSPECIFIED ALTERED MENTAL STATUS TYPE: ICD-10-CM

## 2021-07-14 DIAGNOSIS — G40.409 GRAND MAL SEIZURE (H): Primary | ICD-10-CM

## 2021-07-14 DIAGNOSIS — G47.00 INSOMNIA, UNSPECIFIED TYPE: ICD-10-CM

## 2021-07-14 DIAGNOSIS — I95.9 HYPOTENSION, UNSPECIFIED: ICD-10-CM

## 2021-07-14 PROCEDURE — 99309 SBSQ NF CARE MODERATE MDM 30: CPT | Performed by: FAMILY MEDICINE

## 2021-07-14 NOTE — PROGRESS NOTES
[facility] GERIATRIC SERVICES    Facility:  [facility] ([facility]) [[id number]]  Code Status: DNR      CHIEF COMPLAINT/REASON FOR VISIT:  Chief Complaint   Patient presents with     RECHECK       HISTORY:      HPI: Adebayo is a 89 year old female who resides here at the [facility] TCU undergoing physical and occupational therapy secondary to hospitalization for confusion.  She did have grand mall seizures and sepsis likely due to aspiration did have antibiotics.  Keppra was started in the hospital and she also had Haldol and Seroquel but they said these have been stopped.  Her Zyprexa was tapered off also and she just continues on trazodone for sleep.  She did have high high blood pressure during this TCU stay we have gone up to 25 mg twice daily metoprolol to 37.5 mg.  And now blood pressure has been low.  Blood pressure is 88/80 in reviewing her blood pressures she recently had on 713 highest was 132/98 and then on 714 149/91 and today she was 88/80 and we will recheck that.    Also also up to 117 and reviewing her pulse she has been running high up to 139 and also down to 66.  However she is completely asymptomatic.    I deposed to talk to her son today as well as patient in the room.  She is having no symptoms I think she is extremely happy and content.  We do not want to hospitalize her make a big deal about the above numbers and we did I did discuss that.  At this time we will keep on the same dose of metoprolol and continue to monitor closely.    Patient scheduled discharge Friday however I think that may be too soon at this time given her monitoring over the weekend to see how she does and family is not quite ready at the home for her but will be ready on Monday.  I will discuss this with  and they will likely appeal.    Past Medical History:   Diagnosis Date     Abnormal transaminases      Acute delirium      Acute encephalopathy      Aspiration pneumonia (H)      Atrial  fibrillation (H)      Backache without radiation 09/17/2017     Bacteriuria      Chronic ischemic right MCA stroke      Compression fracture of L1 lumbar vertebra (H) 09/17/2017     Dysphagia      Essential hypertension, benign     Created by Conversion      HTN (hypertension)      Hypokalemia      Hyponatremia      Persistent atrial fibrillation with rapid ventricular response (H)      Seizure (H)              No family history on file.  Social History     Socioeconomic History     Marital status:      Spouse name: Not on file     Number of children: Not on file     Years of education: Not on file     Highest education level: Not on file   Occupational History     Not on file   Tobacco Use     Smoking status: Never Smoker     Smokeless tobacco: Never Used   Substance and Sexual Activity     Alcohol use: Yes     Comment: Alcoholic Drinks/day: 1 glass of red wine with dinner.     Drug use: No     Sexual activity: Not on file   Other Topics Concern     Not on file   Social History Narrative    Patient is accompanied by 4 family members. 09/16/17     Social Determinants of Health     Financial Resource Strain:      Difficulty of Paying Living Expenses:    Food Insecurity:      Worried About Running Out of Food in the Last Year:      Ran Out of Food in the Last Year:    Transportation Needs:      Lack of Transportation (Medical):      Lack of Transportation (Non-Medical):    Physical Activity:      Days of Exercise per Week:      Minutes of Exercise per Session:    Stress:      Feeling of Stress :    Social Connections:      Frequency of Communication with Friends and Family:      Frequency of Social Gatherings with Friends and Family:      Attends [employer] Services:      Active Member of Clubs or Organizations:      Attends Club or Organization Meetings:      Marital Status:    Intimate Partner Violence:      Fear of Current or Ex-Partner:      Emotionally Abused:      Physically Abused:      Sexually Abused:           REVIEW OF SYSTEM: Patient denies any pain fevers chills nausea vomit diarrhea change in vision hearing taste or smell weakness one-sided chest patient is in bed.  She denies any current shortness stool polyphagia polydipsia polyuria depression or anxiety and the main review of systems is negative.  Review of systems is in question secondary to patient's cognitive impairment.      PHYSICAL EXAM: Head was normocephalic and atraumatic extraocular motion was Leo Hyman MD course was moist nose at discharge.  Neck is supple lymphadenopathy thyromegaly.  Lungs are clear to auscultation.  Heart sounds were irregular irregular with adequate rate control and neuro exam was nonfocal.  There is no depression or anxiety and there the remainder the physical exam was negative.      LABS: White count on 7/8/2021 was 11.3.      ASSESSMENT:   Encounter Diagnoses   Name Primary?     Grand mal seizure (H) Yes     Altered mental status, unspecified altered mental status type      Atrial fibrillation with rapid ventricular response (H)      Aspiration pneumonia, unspecified aspiration pneumonia type, unspecified laterality, unspecified part of lung (H)      Insomnia, unspecified type         PLAN: Basic metabolic profile tomorrow secondary to the low blood pressure.    I did have a discussion with the son who was in the room and the patient and she is a DNR/DNI and likely they would try to avoid hospitalization or any heroic measures.  So we decided we will not make a big deal about the tachycardia cardia or the bradycardia or the hypotension at this time she is asymptomatic and will continue to monitor above medical problems.    She is already anticoagulated with apixaban and the repeat blood pressure is 127/85.    I will continue to monitor above medical problems and no other changes to care plan at this time.    Would recommend at this time patient not leave on Friday but stay the weekend secondary to the family is not  ready for her at home yet.  They will appeal the process and I will advocate best I can however I did discuss with him the outcomes of the appeal will indicate when she is discharged.        Electronically signed by: NICOLE TANNER DO

## 2021-07-14 NOTE — LETTER
7/14/2021        RE: Adebayo Bradleyn  [address]   [facility] [id number]        [facility] GERIATRIC SERVICES    Facility:  [facility] ([facility] [[id number]]  Code Status: DNR      CHIEF COMPLAINT/REASON FOR VISIT:  Chief Complaint   Patient presents with     RECHECK       HISTORY:      HPI: Adebayo is a 89 year old female who resides here at the [facility] TCU undergoing physical and occupational therapy secondary to hospitalization for confusion.  She did have grand mall seizures and sepsis likely due to aspiration did have antibiotics.  Keppra was started in the hospital and she also had Haldol and Seroquel but they said these have been stopped.  Her Zyprexa was tapered off also and she just continues on trazodone for sleep.  She did have high high blood pressure during this TCU stay we have gone up to 25 mg twice daily metoprolol to 37.5 mg.  And now blood pressure has been low.  Blood pressure is 88/80 in reviewing her blood pressures she recently had on 713 highest was 132/98 and then on 714 149/91 and today she was 88/80 and we will recheck that.    Also also up to 117 and reviewing her pulse she has been running high up to 139 and also down to 66.  However she is completely asymptomatic.    I deposed to talk to her son today as well as patient in the room.  She is having no symptoms I think she is extremely happy and content.  We do not want to hospitalize her make a big deal about the above numbers and we did I did discuss that.  At this time we will keep on the same dose of metoprolol and continue to monitor closely.    Patient scheduled discharge Friday however I think that may be too soon at this time given her monitoring over the weekend to see how she does and family is not quite ready at the home for her but will be ready on Monday.  I will discuss this with  and they will likely appeal.    Past Medical History:   Diagnosis Date     Abnormal  transaminases      Acute delirium      Acute encephalopathy      Aspiration pneumonia (H)      Atrial fibrillation (H)      Backache without radiation 09/17/2017     Bacteriuria      Chronic ischemic right MCA stroke      Compression fracture of L1 lumbar vertebra (H) 09/17/2017     Dysphagia      Essential hypertension, benign     Created by Conversion      HTN (hypertension)      Hypokalemia      Hyponatremia      Persistent atrial fibrillation with rapid ventricular response (H)      Seizure (H)              No family history on file.  Social History     Socioeconomic History     Marital status:      Spouse name: Not on file     Number of children: Not on file     Years of education: Not on file     Highest education level: Not on file   Occupational History     Not on file   Tobacco Use     Smoking status: Never Smoker     Smokeless tobacco: Never Used   Substance and Sexual Activity     Alcohol use: Yes     Comment: Alcoholic Drinks/day: 1 glass of red wine with dinner.     Drug use: No     Sexual activity: Not on file   Other Topics Concern     Not on file   Social History Narrative    Patient is accompanied by 4 family members. 09/16/17     Social Determinants of Health     Financial Resource Strain:      Difficulty of Paying Living Expenses:    Food Insecurity:      Worried About Running Out of Food in the Last Year:      Ran Out of Food in the Last Year:    Transportation Needs:      Lack of Transportation (Medical):      Lack of Transportation (Non-Medical):    Physical Activity:      Days of Exercise per Week:      Minutes of Exercise per Session:    Stress:      Feeling of Stress :    Social Connections:      Frequency of Communication with Friends and Family:      Frequency of Social Gatherings with Friends and Family:      Attends Anab[employer]ist Services:      Active Member of Clubs or Organizations:      Attends Club or Organization Meetings:      Marital Status:    Intimate Partner Violence:       Fear of Current or Ex-Partner:      Emotionally Abused:      Physically Abused:      Sexually Abused:          REVIEW OF SYSTEM: Patient denies any pain fevers chills nausea vomit diarrhea change in vision hearing taste or smell weakness one-sided chest patient is in bed.  She denies any current shortness stool polyphagia polydipsia polyuria depression or anxiety and the main review of systems is negative.  Review of systems is in question secondary to patient's cognitive impairment.      PHYSICAL EXAM: Head was normocephalic and atraumatic extraocular motion was Leo Hyman MD course was moist nose at discharge.  Neck is supple lymphadenopathy thyromegaly.  Lungs are clear to auscultation.  Heart sounds were irregular irregular with adequate rate control and neuro exam was nonfocal.  There is no depression or anxiety and there the remainder the physical exam was negative.      LABS: White count on 7/8/2021 was 11.3.      ASSESSMENT:   Encounter Diagnoses   Name Primary?     Grand mal seizure (H) Yes     Altered mental status, unspecified altered mental status type      Atrial fibrillation with rapid ventricular response (H)      Aspiration pneumonia, unspecified aspiration pneumonia type, unspecified laterality, unspecified part of lung (H)      Insomnia, unspecified type         PLAN: Basic metabolic profile tomorrow secondary to the low blood pressure.    I did have a discussion with the son who was in the room and the patient and she is a DNR/DNI and likely they would try to avoid hospitalization or any heroic measures.  So we decided we will not make a big deal about the tachycardia cardia or the bradycardia or the hypotension at this time she is asymptomatic and will continue to monitor above medical problems.    She is already anticoagulated with apixaban and the repeat blood pressure is 127/85.    I will continue to monitor above medical problems and no other changes to care plan at this time.    Would  recommend at this time patient not leave on Friday but stay the weekend secondary to the family is not ready for her at home yet.  They will appeal the process and I will advocate best I can however I did discuss with him the outcomes of the appeal will indicate when she is discharged.        Electronically signed by: NICOLE TANNER DO        Sincerely,        NICOLE TANNER DO

## 2021-07-15 ENCOUNTER — TELEPHONE (OUTPATIENT)
Dept: GERIATRICS | Facility: CLINIC | Age: 86
End: 2021-07-15

## 2021-07-15 ENCOUNTER — LAB REQUISITION (OUTPATIENT)
Dept: LAB | Facility: CLINIC | Age: 86
End: 2021-07-15
Payer: COMMERCIAL

## 2021-07-15 DIAGNOSIS — I48.91 UNSPECIFIED ATRIAL FIBRILLATION (H): ICD-10-CM

## 2021-07-15 LAB
ANION GAP SERPL CALCULATED.3IONS-SCNC: 13 MMOL/L (ref 5–18)
BUN SERPL-MCNC: 25 MG/DL (ref 8–28)
CALCIUM SERPL-MCNC: 8.7 MG/DL (ref 8.5–10.5)
CHLORIDE BLD-SCNC: 103 MMOL/L (ref 98–107)
CO2 SERPL-SCNC: 29 MMOL/L (ref 22–31)
CREAT SERPL-MCNC: 0.68 MG/DL (ref 0.6–1.1)
GFR SERPL CREATININE-BSD FRML MDRD: 76 ML/MIN/1.73M2
GLUCOSE BLD-MCNC: 99 MG/DL (ref 70–125)
POTASSIUM BLD-SCNC: 2.7 MMOL/L (ref 3.5–5)
SODIUM SERPL-SCNC: 145 MMOL/L (ref 136–145)

## 2021-07-15 PROCEDURE — 82374 ASSAY BLOOD CARBON DIOXIDE: CPT | Performed by: FAMILY MEDICINE

## 2021-07-15 PROCEDURE — 36415 COLL VENOUS BLD VENIPUNCTURE: CPT | Performed by: FAMILY MEDICINE

## 2021-07-15 PROCEDURE — P9604 ONE-WAY ALLOW PRORATED TRIP: HCPCS | Performed by: FAMILY MEDICINE

## 2021-07-15 PROCEDURE — 80048 BASIC METABOLIC PNL TOTAL CA: CPT | Performed by: FAMILY MEDICINE

## 2021-07-15 NOTE — TELEPHONE ENCOUNTER
[facility] Nurse Triage Telephone Note    Provider: Tutu Brown DO  Facility: [facility]   Facility Type:  TCU    Caller: Pepper  Call Back Number: [phone]    Allergies   Allergen Reactions     Alendronate [Alendronic Acid] Unknown       Reason for call: Nurse called critical lab value for K+ 2.7. The rest of the BMP is WNL    Verbal Order/Direction given by Provider: Give Potassium Chl 20mEq now then another Pot Chl 20mEq this afternoon. Recheck K+ tomorro 7/16/21.    Provider giving Order:  Tutu Brown DO    Verbal Order given to: Pepper Bui RN

## 2021-07-16 ENCOUNTER — TRANSITIONAL CARE UNIT VISIT (OUTPATIENT)
Dept: GERIATRICS | Facility: CLINIC | Age: 86
End: 2021-07-16
Payer: COMMERCIAL

## 2021-07-16 VITALS
SYSTOLIC BLOOD PRESSURE: 120 MMHG | HEIGHT: 60 IN | BODY MASS INDEX: 23.84 KG/M2 | TEMPERATURE: 97.9 F | OXYGEN SATURATION: 96 % | WEIGHT: 121.4 LBS | RESPIRATION RATE: 16 BRPM | DIASTOLIC BLOOD PRESSURE: 90 MMHG | HEART RATE: 104 BPM

## 2021-07-16 VITALS
HEIGHT: 60 IN | SYSTOLIC BLOOD PRESSURE: 130 MMHG | HEART RATE: 88 BPM | TEMPERATURE: 97.9 F | WEIGHT: 121.5 LBS | DIASTOLIC BLOOD PRESSURE: 90 MMHG | BODY MASS INDEX: 23.85 KG/M2 | RESPIRATION RATE: 16 BRPM | OXYGEN SATURATION: 96 %

## 2021-07-16 DIAGNOSIS — G47.00 INSOMNIA, UNSPECIFIED TYPE: ICD-10-CM

## 2021-07-16 DIAGNOSIS — I48.91 ATRIAL FIBRILLATION WITH RAPID VENTRICULAR RESPONSE (H): ICD-10-CM

## 2021-07-16 DIAGNOSIS — G40.409 GRAND MAL SEIZURE (H): Primary | ICD-10-CM

## 2021-07-16 DIAGNOSIS — J69.0 ASPIRATION PNEUMONIA, UNSPECIFIED ASPIRATION PNEUMONIA TYPE, UNSPECIFIED LATERALITY, UNSPECIFIED PART OF LUNG (H): ICD-10-CM

## 2021-07-16 DIAGNOSIS — R41.82 ALTERED MENTAL STATUS, UNSPECIFIED ALTERED MENTAL STATUS TYPE: ICD-10-CM

## 2021-07-16 LAB
MAGNESIUM SERPL-MCNC: 1.5 MG/DL (ref 1.8–2.6)
POTASSIUM BLD-SCNC: 3.8 MMOL/L (ref 3.5–5)

## 2021-07-16 PROCEDURE — P9604 ONE-WAY ALLOW PRORATED TRIP: HCPCS | Mod: ORL | Performed by: FAMILY MEDICINE

## 2021-07-16 PROCEDURE — 83735 ASSAY OF MAGNESIUM: CPT | Mod: ORL | Performed by: FAMILY MEDICINE

## 2021-07-16 PROCEDURE — 99309 SBSQ NF CARE MODERATE MDM 30: CPT | Performed by: FAMILY MEDICINE

## 2021-07-16 PROCEDURE — 36415 COLL VENOUS BLD VENIPUNCTURE: CPT | Mod: ORL | Performed by: FAMILY MEDICINE

## 2021-07-16 PROCEDURE — 84132 ASSAY OF SERUM POTASSIUM: CPT | Mod: ORL | Performed by: FAMILY MEDICINE

## 2021-07-16 ASSESSMENT — MIFFLIN-ST. JEOR
SCORE: 877.17
SCORE: 877.62

## 2021-07-16 NOTE — PROGRESS NOTES
[facility] GERIATRIC SERVICES    Facility:  [facility] (SNF) [[id number]]  Code Status: DNR      CHIEF COMPLAINT/REASON FOR VISIT:  Chief Complaint   Patient presents with     RECHECK       HISTORY:      HPI: Adebayo is a 89 year old female who resides here at the [facility] TCU after hospitalization from confusion and she did have grand mal seizures and is currently on Keppra at this time and no seizures been noted.  She also was on point put on antipsychotics in the hospital Haldol Seroquel and Zyprexa and they have all been tapered off and she has no need for these.  She is on trazodone for sleep and seems to be doing very well.  She has had high blood pressures and metoprolol has been increased at this time and her blood pressure is 120/90 at this time.  She is moving towards more comfort cares and try not to do too many hospitalizations and pulse is down from 66 up to 140.  She is irregular regular but does good up and down.  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  I did discuss with son likely we will not do anything and send her in for pacemaker at this time so we will continue to monitor and she is a symptomatic.    Today she is lying in bed comfortably she still indicates she has no issues.  Staff have no new concerns outside the low potassium in which she did receive 40 mEq of potassium yesterday.  Her potassium is pending for today.    Past Medical History:   Diagnosis Date     Abnormal transaminases      Acute delirium      Acute encephalopathy      Aspiration pneumonia (H)      Atrial fibrillation (H)      Backache without radiation 09/17/2017     Bacteriuria      Chronic ischemic right MCA stroke      Compression fracture of L1 lumbar vertebra (H) 09/17/2017     Dysphagia      Essential hypertension, benign     Created by Conversion      HTN (hypertension)      Hypokalemia      Hyponatremia      Persistent atrial fibrillation with rapid ventricular response (H)       Seizure (H)              No family history on file.  Social History     Socioeconomic History     Marital status:      Spouse name: Not on file     Number of children: Not on file     Years of education: Not on file     Highest education level: Not on file   Occupational History     Not on file   Tobacco Use     Smoking status: Never Smoker     Smokeless tobacco: Never Used   Substance and Sexual Activity     Alcohol use: Yes     Comment: Alcoholic Drinks/day: 1 glass of red wine with dinner.     Drug use: No     Sexual activity: Not on file   Other Topics Concern     Not on file   Social History Narrative    Patient is accompanied by 4 family members. 09/16/17     Social Determinants of Health     Financial Resource Strain:      Difficulty of Paying Living Expenses:    Food Insecurity:      Worried About Running Out of Food in the Last Year:      Ran Out of Food in the Last Year:    Transportation Needs:      Lack of Transportation (Medical):      Lack of Transportation (Non-Medical):    Physical Activity:      Days of Exercise per Week:      Minutes of Exercise per Session:    Stress:      Feeling of Stress :    Social Connections:      Frequency of Communication with Friends and Family:      Frequency of Social Gatherings with Friends and Family:      Attends [employer] Services:      Active Member of Clubs or Organizations:      Attends Club or Organization Meetings:      Marital Status:    Intimate Partner Violence:      Fear of Current or Ex-Partner:      Emotionally Abused:      Physically Abused:      Sexually Abused:          REVIEW OF SYSTEM: Patient denies any pain fevers chills nausea vomit diarrhea change in vision hearing taste or smell weakness one-sided chest pain shortness of breath.  Denies incontinent of stool polyphagia polydipsia polyuria depression or anxiety and the main review of systems is negative.    Staff indicates to me that she does have some behavioral issues but they are very  "mild at this time.  And she is most of the time very pleasant.      PHYSICAL EXAM: Patient is alert pleasantly confused does not appear to be in acute distress.  Head was no soft and atraumatic extraocular motion intact \"was moist no such as yet neck is supple lymphadenopathy thyromegaly.  Heart sounds are regular lungs clear abdomen soft nontender extremities without cyanosis or edema skin was intact and neuro exam was baseline.        LABS: Potassium yesterday was 2.7 but the rest of the basic metabolic profile was within normal limits.      ASSESSMENT:   Encounter Diagnoses   Name Primary?     Grand mal seizure (H) Yes     Altered mental status, unspecified altered mental status type      Insomnia, unspecified type      Aspiration pneumonia, unspecified aspiration pneumonia type, unspecified laterality, unspecified part of lung (H)      Atrial fibrillation with rapid ventricular response (H)         PLAN: Plan at this time I will await the potassium results today and will likely put her on a maintenance dose of potassium likely 20 mEq to 40 mEq daily.    We will continue medications as ordered with no new changes at this time and should continue with physical and Occupational Therapy.    As far as her tachybradycardia syndrome at this time I did increase her metoprolol however her blood pressure does go somewhat low so no more increases her metoprolol will be done at this time.  I did discuss with son and states she is asymptomatic and content and happy at this time we will not pursue any cardiology consult.    I will continue to monitor above medical problems and no other changes to care plan at this time.        Electronically signed by: NICOLE TANNER DO  "

## 2021-07-16 NOTE — LETTER
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  She also was on point put on antipsychotics in the hospital Haldol Seroquel and Zyprexa and they have all been tapered off and she has no need for these.  She is on trazodone for sleep and seems to be doing very well.  She has had high blood pressures and metoprolol has been increased at this time and her blood pressure is 120/90 at this time.  She is moving towards more comfort cares and try not to do too many hospitalizations and pulse is down from 66 up to 140.  She is irregular regular but does good up and down.  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  I did discuss with son likely we will not do anything and send her in for pacemaker at this time so we will continue to monitor and she is a symptomatic.    Today she is lying in bed comfortably she still indicates she has no issues.  Staff have no new concerns outside the low potassium in which she did receive 40 mEq of potassium yesterday.  Her potassium is pending for today.    Past Medical History:   Diagnosis Date     Abnormal transaminases      Acute delirium      Acute encephalopathy      Aspiration pneumonia (H)      Atrial fibrillation (H)      Backache without radiation 09/17/2017     Bacteriuria      Chronic ischemic right MCA stroke      Compression fracture of L1 lumbar vertebra (H) 09/17/2017     Dysphagia      Essential hypertension, benign     Created by Conversion      HTN (hypertension)      Hypokalemia       Hyponatremia      Persistent atrial fibrillation with rapid ventricular response (H)      Seizure (H)              No family history on file.  Social History     Socioeconomic History     Marital status:      Spouse name: Not on file     Number of children: Not on file     Years of education: Not on file     Highest education level: Not on file   Occupational History     Not on file   Tobacco Use     Smoking status: Never Smoker     Smokeless tobacco: Never Used   Substance and Sexual Activity     Alcohol use: Yes     Comment: Alcoholic Drinks/day: 1 glass of red wine with dinner.     Drug use: No     Sexual activity: Not on file   Other Topics Concern     Not on file   Social History Narrative    Patient is accompanied by 4 family members. 09/16/17     Social Determinants of Health     Financial Resource Strain:      Difficulty of Paying Living Expenses:    Food Insecurity:      Worried About Running Out of Food in the Last Year:      Ran Out of Food in the Last Year:    Transportation Needs:      Lack of Transportation (Medical):      Lack of Transportation (Non-Medical):    Physical Activity:      Days of Exercise per Week:      Minutes of Exercise per Session:    Stress:      Feeling of Stress :    Social Connections:      Frequency of Communication with Friends and Family:      Frequency of Social Gatherings with Friends and Family:      Attends Rastafarian Services:      Active Member of Clubs or Organizations:      Attends Club or Organization Meetings:      Marital Status:    Intimate Partner Violence:      Fear of Current or Ex-Partner:      Emotionally Abused:      Physically Abused:      Sexually Abused:          REVIEW OF SYSTEM: Patient denies any pain fevers chills nausea vomit diarrhea change in vision hearing taste or smell weakness one-sided chest pain shortness of breath.  Denies incontinent of stool polyphagia polydipsia polyuria depression or anxiety and the main review of systems is  "negative.    Staff indicates to me that she does have some behavioral issues but they are very mild at this time.  And she is most of the time very pleasant.      PHYSICAL EXAM: Patient is alert pleasantly confused does not appear to be in acute distress.  Head was no soft and atraumatic extraocular motion intact \"was moist no such as yet neck is supple lymphadenopathy thyromegaly.  Heart sounds are regular lungs clear abdomen soft nontender extremities without cyanosis or edema skin was intact and neuro exam was baseline.        LABS: Potassium yesterday was 2.7 but the rest of the basic metabolic profile was within normal limits.      ASSESSMENT:   Encounter Diagnoses   Name Primary?     Grand mal seizure (H) Yes     Altered mental status, unspecified altered mental status type      Insomnia, unspecified type      Aspiration pneumonia, unspecified aspiration pneumonia type, unspecified laterality, unspecified part of lung (H)      Atrial fibrillation with rapid ventricular response (H)         PLAN: Plan at this time I will await the potassium results today and will likely put her on a maintenance dose of potassium likely 20 mEq to 40 mEq daily.    We will continue medications as ordered with no new changes at this time and should continue with physical and Occupational Therapy.    As far as her tachybradycardia syndrome at this time I did increase her metoprolol however her blood pressure does go somewhat low so no more increases her metoprolol will be done at this time.  I did discuss with son and states she is asymptomatic and content and happy at this time we will not pursue any cardiology consult.    I will continue to monitor above medical problems and no other changes to care plan at this time.        Electronically signed by: NICOLE TANNER DO        Sincerely,        NICOLE TANNER, DO    "

## 2021-07-16 NOTE — PROGRESS NOTES
[facility] GERIATRIC SERVICES    Facility:  [facility] (SNF) [[id number]]  Code Status: DNR      CHIEF COMPLAINT/REASON FOR VISIT:  Chief Complaint   Patient presents with     RECHECK       HISTORY:      HPI: Adebayo is a 89 year old female who resides here at the [facility] TCU undergoing physical and occupational therapy secondary grand mal seizures she is on Keppra at this time and no more seizures have been noted she was on Haldol Seroquel Zyprexa in the hospital but tapered off of the been stopped.  I had talked to family that I do not wish her to be on this at this time I do not feel she needs them.  She is a very good personality but does have some dementia.  She did have high blood pressures but med adjustments have been made they were to move towards comfort cares.  Pulses all over the place up to 66 up to 140 and present is 58.  She does have sick sinus syndrome however we are not can give her current cardiology consult for pacemaker.  She she has no complaints at this time.    Past Medical History:   Diagnosis Date     Abnormal transaminases      Acute delirium      Acute encephalopathy      Aspiration pneumonia (H)      Atrial fibrillation (H)      Backache without radiation 09/17/2017     Bacteriuria      Chronic ischemic right MCA stroke      Compression fracture of L1 lumbar vertebra (H) 09/17/2017     Dysphagia      Essential hypertension, benign     Created by Conversion      HTN (hypertension)      Hypokalemia      Hyponatremia      Persistent atrial fibrillation with rapid ventricular response (H)      Seizure (H)              No family history on file.  Social History     Socioeconomic History     Marital status:      Spouse name: Not on file     Number of children: Not on file     Years of education: Not on file     Highest education level: Not on file   Occupational History     Not on file   Tobacco Use     Smoking status: Never Smoker     Smokeless tobacco: Never Used    Substance and Sexual Activity     Alcohol use: Yes     Comment: Alcoholic Drinks/day: 1 glass of red wine with dinner.     Drug use: No     Sexual activity: Not on file   Other Topics Concern     Not on file   Social History Narrative    Patient is accompanied by 4 family members. 09/16/17     Social Determinants of Health     Financial Resource Strain:      Difficulty of Paying Living Expenses:    Food Insecurity:      Worried About Running Out of Food in the Last Year:      Ran Out of Food in the Last Year:    Transportation Needs:      Lack of Transportation (Medical):      Lack of Transportation (Non-Medical):    Physical Activity:      Days of Exercise per Week:      Minutes of Exercise per Session:    Stress:      Feeling of Stress :    Social Connections:      Frequency of Communication with Friends and Family:      Frequency of Social Gatherings with Friends and Family:      Attends Christian Services:      Active Member of Clubs or Organizations:      Attends Club or Organization Meetings:      Marital Status:    Intimate Partner Violence:      Fear of Current or Ex-Partner:      Emotionally Abused:      Physically Abused:      Sexually Abused:          REVIEW OF SYSTEM: He denies any chest pain shortness of breath fevers chills nausea vomiting.  She is moving her bowels okay according to staff and urinating without difficulty.      PHYSICAL EXAM: Patient is alert pleasant does not appear to be in acute distress.  She is in her usual jovial mood.  Head is no swelling atraumatic sclera conjunctive is clear oromucosa is moist nose at discharge heart sounds were regular at this time so slightly bradycardic with a rate of about 54-60 and lungs are clear to auscultation.  Abdomen was distended nontender bowel sounds were active in all 4 quadrants.  Extremities without cyanosis or edema.        LABS: His potassium is 3.0 and magnesium was 1.4.      ASSESSMENT:   Encounter Diagnoses   Name Primary?     Grand mal  seizure (H) Yes     Aspiration pneumonia, unspecified aspiration pneumonia type, unspecified laterality, unspecified part of lung (H)      Altered mental status, unspecified altered mental status type      Atrial fibrillation with rapid ventricular response (H)         PLAN: Plan at this time will increase the mag oxide to 400 twice daily and the potassium chloride will 20 mEq twice daily x1 day and then 20 mEq daily.  Magnesium and potassium will be drawn tomorrow.  Likely she will be on 20 mEq daily.    I will continue to monitor above medical problems and no other changes to care plan at this time.        Electronically signed by: NICOLE TANNER DO

## 2021-07-18 ENCOUNTER — LAB REQUISITION (OUTPATIENT)
Dept: LAB | Facility: CLINIC | Age: 86
End: 2021-07-18
Payer: COMMERCIAL

## 2021-07-18 DIAGNOSIS — Z51.81 ENCOUNTER FOR THERAPEUTIC DRUG LEVEL MONITORING: ICD-10-CM

## 2021-07-19 LAB
MAGNESIUM SERPL-MCNC: 1.5 MG/DL (ref 1.8–2.6)
POTASSIUM BLD-SCNC: 3 MMOL/L (ref 3.5–5)

## 2021-07-19 PROCEDURE — 84132 ASSAY OF SERUM POTASSIUM: CPT | Performed by: FAMILY MEDICINE

## 2021-07-19 PROCEDURE — 36415 COLL VENOUS BLD VENIPUNCTURE: CPT | Mod: ORL | Performed by: FAMILY MEDICINE

## 2021-07-19 PROCEDURE — 36415 COLL VENOUS BLD VENIPUNCTURE: CPT | Performed by: FAMILY MEDICINE

## 2021-07-19 PROCEDURE — P9604 ONE-WAY ALLOW PRORATED TRIP: HCPCS | Mod: ORL | Performed by: FAMILY MEDICINE

## 2021-07-19 PROCEDURE — 83735 ASSAY OF MAGNESIUM: CPT | Mod: ORL | Performed by: FAMILY MEDICINE

## 2021-07-19 PROCEDURE — 83735 ASSAY OF MAGNESIUM: CPT | Performed by: FAMILY MEDICINE

## 2021-07-19 PROCEDURE — 84132 ASSAY OF SERUM POTASSIUM: CPT | Mod: ORL | Performed by: FAMILY MEDICINE

## 2021-07-20 ENCOUNTER — TRANSITIONAL CARE UNIT VISIT (OUTPATIENT)
Dept: GERIATRICS | Facility: CLINIC | Age: 86
End: 2021-07-20
Payer: COMMERCIAL

## 2021-07-20 ENCOUNTER — LAB REQUISITION (OUTPATIENT)
Dept: LAB | Facility: CLINIC | Age: 86
End: 2021-07-20
Payer: COMMERCIAL

## 2021-07-20 DIAGNOSIS — J69.0 ASPIRATION PNEUMONIA, UNSPECIFIED ASPIRATION PNEUMONIA TYPE, UNSPECIFIED LATERALITY, UNSPECIFIED PART OF LUNG (H): ICD-10-CM

## 2021-07-20 DIAGNOSIS — R41.82 ALTERED MENTAL STATUS, UNSPECIFIED ALTERED MENTAL STATUS TYPE: ICD-10-CM

## 2021-07-20 DIAGNOSIS — G40.409 GRAND MAL SEIZURE (H): Primary | ICD-10-CM

## 2021-07-20 DIAGNOSIS — E44.0 MODERATE PROTEIN-CALORIE MALNUTRITION (H): ICD-10-CM

## 2021-07-20 DIAGNOSIS — I48.91 ATRIAL FIBRILLATION WITH RAPID VENTRICULAR RESPONSE (H): ICD-10-CM

## 2021-07-20 PROCEDURE — 99309 SBSQ NF CARE MODERATE MDM 30: CPT | Performed by: FAMILY MEDICINE

## 2021-07-20 NOTE — LETTER
7/19/2021        RE: Adebayo EISENBERG Tscgreysonn  [address]   [place] [address]        [facility]    Facility:  [facility] ([facility]) [[id number]]  Code Status: DNR      CHIEF COMPLAINT/REASON FOR VISIT:  Chief Complaint   Patient presents with     RECHECK       HISTORY:      HPI: Adebayo is a 89 year old female who resides here at the [facility] undergoing physical and occupational therapy secondary grand mal seizures she is on Keppra at this time and no more seizures have been noted she was on Haldol Seroquel Zyprexa in the hospital but tapered off of the been stopped.  I had talked to family that I do not wish her to be on this at this time I do not feel she needs them.  She is a very good personality but does have some dementia.  She did have high blood pressures but med adjustments have been made they were to move towards comfort cares.  Pulses all over the place up to 66 up to 140 and present is 58.  She does have sick sinus syndrome however we are not can give her current cardiology consult for pacemaker.  She she has no complaints at this time.    Past Medical History:   Diagnosis Date     Abnormal transaminases      Acute delirium      Acute encephalopathy      Aspiration pneumonia (H)      Atrial fibrillation (H)      Backache without radiation 09/17/2017     Bacteriuria      Chronic ischemic right MCA stroke      Compression fracture of L1 lumbar vertebra (H) 09/17/2017     Dysphagia      Essential hypertension, benign     Created by Conversion      HTN (hypertension)      Hypokalemia      Hyponatremia      Persistent atrial fibrillation with rapid ventricular response (H)      Seizure (H)              No family history on file.  Social History     Socioeconomic History     Marital status:      Spouse name: Not on file     Number of children: Not on file     Years of education: Not on file     Highest education level: Not on file   Occupational History      Not on file   Tobacco Use     Smoking status: Never Smoker     Smokeless tobacco: Never Used   Substance and Sexual Activity     Alcohol use: Yes     Comment: Alcoholic Drinks/day: 1 glass of red wine with dinner.     Drug use: No     Sexual activity: Not on file   Other Topics Concern     Not on file   Social History Narrative    Patient is accompanied by 4 family members. 09/16/17     Social Determinants of Health     Financial Resource Strain:      Difficulty of Paying Living Expenses:    Food Insecurity:      Worried About Running Out of Food in the Last Year:      Ran Out of Food in the Last Year:    Transportation Needs:      Lack of Transportation (Medical):      Lack of Transportation (Non-Medical):    Physical Activity:      Days of Exercise per Week:      Minutes of Exercise per Session:    Stress:      Feeling of Stress :    Social Connections:      Frequency of Communication with Friends and Family:      Frequency of Social Gatherings with Friends and Family:      Attends Muslim Services:      Active Member of Clubs or Organizations:      Attends Club or Organization Meetings:      Marital Status:    Intimate Partner Violence:      Fear of Current or Ex-Partner:      Emotionally Abused:      Physically Abused:      Sexually Abused:          REVIEW OF SYSTEM: He denies any chest pain shortness of breath fevers chills nausea vomiting.  She is moving her bowels okay according to staff and urinating without difficulty.      PHYSICAL EXAM: Patient is alert pleasant does not appear to be in acute distress.  She is in her usual jovial mood.  Head is no swelling atraumatic sclera conjunctive is clear oromucosa is moist nose at discharge heart sounds were regular at this time so slightly bradycardic with a rate of about 54-60 and lungs are clear to auscultation.  Abdomen was distended nontender bowel sounds were active in all 4 quadrants.  Extremities without cyanosis or edema.        LABS: His potassium  is 3.0 and magnesium was 1.4.      ASSESSMENT:   Encounter Diagnoses   Name Primary?     Grand mal seizure (H) Yes     Aspiration pneumonia, unspecified aspiration pneumonia type, unspecified laterality, unspecified part of lung (H)      Altered mental status, unspecified altered mental status type      Atrial fibrillation with rapid ventricular response (H)         PLAN: Plan at this time will increase the mag oxide to 400 twice daily and the potassium chloride will 20 mEq twice daily x1 day and then 20 mEq daily.  Magnesium and potassium will be drawn tomorrow.  Likely she will be on 20 mEq daily.    I will continue to monitor above medical problems and no other changes to care plan at this time.        Electronically signed by: NICOLE TANNER DO        Sincerely,        NICOLE TANNER DO

## 2021-07-21 ENCOUNTER — RECORDS - HEALTHEAST (OUTPATIENT)
Dept: ADMINISTRATIVE | Facility: CLINIC | Age: 86
End: 2021-07-21

## 2021-07-21 ENCOUNTER — TELEPHONE (OUTPATIENT)
Dept: GERIATRICS | Facility: CLINIC | Age: 86
End: 2021-07-21

## 2021-07-21 LAB
MAGNESIUM SERPL-MCNC: 1.9 MG/DL (ref 1.8–2.6)
POTASSIUM BLD-SCNC: 3.8 MMOL/L (ref 3.5–5)

## 2021-07-21 PROCEDURE — 36415 COLL VENOUS BLD VENIPUNCTURE: CPT | Mod: ORL | Performed by: FAMILY MEDICINE

## 2021-07-21 PROCEDURE — 83735 ASSAY OF MAGNESIUM: CPT | Mod: ORL | Performed by: FAMILY MEDICINE

## 2021-07-21 PROCEDURE — 84132 ASSAY OF SERUM POTASSIUM: CPT | Mod: ORL | Performed by: FAMILY MEDICINE

## 2021-07-21 PROCEDURE — 84132 ASSAY OF SERUM POTASSIUM: CPT | Performed by: FAMILY MEDICINE

## 2021-07-21 PROCEDURE — P9604 ONE-WAY ALLOW PRORATED TRIP: HCPCS | Mod: ORL | Performed by: FAMILY MEDICINE

## 2021-07-21 PROCEDURE — 36415 COLL VENOUS BLD VENIPUNCTURE: CPT | Performed by: FAMILY MEDICINE

## 2021-07-21 PROCEDURE — 83735 ASSAY OF MAGNESIUM: CPT | Performed by: FAMILY MEDICINE

## 2021-07-21 RX ORDER — POTASSIUM CHLORIDE 1500 MG/1
20 TABLET, EXTENDED RELEASE ORAL DAILY
COMMUNITY
Start: 2021-07-19

## 2021-07-21 RX ORDER — MAGNESIUM OXIDE 400 MG/1
400 TABLET ORAL DAILY
COMMUNITY
Start: 2021-07-21

## 2021-07-21 NOTE — TELEPHONE ENCOUNTER
[facility] Nurse Triage Telephone Note    Provider: Tutu Brown DO  Facility: [facility] Facility Type:  TCU    Caller: Pepper  Call Back Number: [phone]    Allergies:    Allergies   Allergen Reactions     Alendronate [Alendronic Acid] Unknown        Reason for call: Nurse calling to report potassium and magnesium results.  Notable meds:  Benazepril 20mg daily, potassium 20meq daily, magnesium oxide 400mg BID.      Verbal Order/Direction given by Provider: Decrease magnesium oxide to 400mg daily.  Check potassium level on 7/23/21.      Provider giving Order:  Tutu Brown DO    Verbal Order given to: Pepper Melgar RN

## 2021-07-22 ENCOUNTER — LAB REQUISITION (OUTPATIENT)
Dept: LAB | Facility: CLINIC | Age: 86
End: 2021-07-22
Payer: COMMERCIAL

## 2021-07-22 DIAGNOSIS — E87.6 HYPOKALEMIA: ICD-10-CM

## 2021-07-23 LAB — POTASSIUM BLD-SCNC: 4.1 MMOL/L (ref 3.5–5)

## 2021-07-23 PROCEDURE — 84132 ASSAY OF SERUM POTASSIUM: CPT | Mod: ORL | Performed by: FAMILY MEDICINE

## 2021-07-23 PROCEDURE — 36415 COLL VENOUS BLD VENIPUNCTURE: CPT | Mod: ORL | Performed by: FAMILY MEDICINE

## 2021-07-23 PROCEDURE — P9604 ONE-WAY ALLOW PRORATED TRIP: HCPCS | Mod: ORL | Performed by: FAMILY MEDICINE

## 2021-07-27 VITALS
SYSTOLIC BLOOD PRESSURE: 140 MMHG | TEMPERATURE: 97.3 F | DIASTOLIC BLOOD PRESSURE: 84 MMHG | HEIGHT: 60 IN | HEART RATE: 68 BPM | WEIGHT: 115 LBS | BODY MASS INDEX: 22.58 KG/M2 | RESPIRATION RATE: 18 BRPM | OXYGEN SATURATION: 99 %

## 2021-07-27 ASSESSMENT — MIFFLIN-ST. JEOR: SCORE: 848.14

## 2021-07-27 NOTE — PROGRESS NOTES
[facility] GERIATRIC SERVICES    Facility:  [facility] ([facility]) [[id number]]  Code Status: DNR      CHIEF COMPLAINT/REASON FOR VISIT:  Chief Complaint   Patient presents with     RECHECK       HISTORY:      HPI: Adebayo is a 89 year old female here at the [facility] TCU on 7-week we will return with physical therapy.  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  She was recently on Seroquel and Zyprexa in the hospital but all been tapered off.  Including Haldol.  Her palate she does have dementia but her personality has been very good and she has been very pleasant here.  We are waiting for placement.  Pulses 2 jump all over the place 66-1 40 and right now she is at 77.  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  She is comfortable and has no pain issues and no complaints.    Past Medical History:   Diagnosis Date     Abnormal transaminases      Acute delirium      Acute encephalopathy      Aspiration pneumonia (H)      Atrial fibrillation (H)      Backache without radiation 09/17/2017     Bacteriuria      Chronic ischemic right MCA stroke      Compression fracture of L1 lumbar vertebra (H) 09/17/2017     Dysphagia      Essential hypertension, benign     Created by Conversion      HTN (hypertension)      Hypokalemia      Hyponatremia      Persistent atrial fibrillation with rapid ventricular response (H)      Seizure (H)              No family history on file.  Social History     Socioeconomic History     Marital status:      Spouse name: Not on file     Number of children: Not on file     Years of education: Not on file     Highest education level: Not on file   Occupational History     Not on file   Tobacco Use     Smoking status: Never Smoker     Smokeless tobacco: Never Used   Substance and Sexual Activity      Alcohol use: Yes     Comment: Alcoholic Drinks/day: 1 glass of red wine with dinner.     Drug use: No     Sexual activity: Not on file   Other Topics Concern     Not on file   Social History Narrative    Patient is accompanied by 4 family members. 09/16/17     Social Determinants of Health     Financial Resource Strain:      Difficulty of Paying Living Expenses:    Food Insecurity:      Worried About Running Out of Food in the Last Year:      Ran Out of Food in the Last Year:    Transportation Needs:      Lack of Transportation (Medical):      Lack of Transportation (Non-Medical):    Physical Activity:      Days of Exercise per Week:      Minutes of Exercise per Session:    Stress:      Feeling of Stress :    Social Connections:      Frequency of Communication with Friends and Family:      Frequency of Social Gatherings with Friends and Family:      Attends Scientology Services:      Active Member of Clubs or Organizations:      Attends Club or Organization Meetings:      Marital Status:    Intimate Partner Violence:      Fear of Current or Ex-Partner:      Emotionally Abused:      Physically Abused:      Sexually Abused:          REVIEW OF SYSTEM: Patient denies any pain fevers chills nausea vomit diarrhea change in vision hearing taste or smell weakness one-sided chest pain shortness of breath.  Denies any current shortness stool polyphagia polydipsia polyuria depression or anxiety and the main review of systems is negative.      PHYSICAL EXAM: Alert pleasant is not appear to be in acute distress very confused at this time.  And that is baseline.  Head was normocephalic and atraumatic sclera conjunctiva was clear.  Heart sounds were regular at this time regular rate and rhythm lungs are clear auscultation.  Extremities without cyanosis or edema.  Bowel sounds are positive in all 4 quadrants.        LABS:       ASSESSMENT:   Encounter Diagnoses   Name Primary?     Grand mal seizure (H) Yes     Aspiration pneumonia,  unspecified aspiration pneumonia type, unspecified laterality, unspecified part of lung (H)      Altered mental status, unspecified altered mental status type      Atrial fibrillation with rapid ventricular response (H)         PLAN: Plan at this time we will continue to monitor above medical problems no new changes.  I will start treating her like she is long-term care at this time and be she has completed her TCU stay and there were no issues.  Likely I will see her once a month if she continues here with her bed but placement will be done soon.  And I will do a discharge summary when that time comes around.  Otherwise no new changes to care plan at this time.        Electronically signed by: NICOLE TANNER,

## 2021-07-28 ENCOUNTER — TRANSITIONAL CARE UNIT VISIT (OUTPATIENT)
Dept: GERIATRICS | Facility: CLINIC | Age: 86
End: 2021-07-28
Payer: COMMERCIAL

## 2021-07-28 DIAGNOSIS — J69.0 ASPIRATION PNEUMONIA, UNSPECIFIED ASPIRATION PNEUMONIA TYPE, UNSPECIFIED LATERALITY, UNSPECIFIED PART OF LUNG (H): ICD-10-CM

## 2021-07-28 DIAGNOSIS — G40.409 GRAND MAL SEIZURE (H): Primary | ICD-10-CM

## 2021-07-28 DIAGNOSIS — I48.91 ATRIAL FIBRILLATION WITH RAPID VENTRICULAR RESPONSE (H): ICD-10-CM

## 2021-07-28 DIAGNOSIS — R41.82 ALTERED MENTAL STATUS, UNSPECIFIED ALTERED MENTAL STATUS TYPE: ICD-10-CM

## 2021-07-28 PROCEDURE — 99309 SBSQ NF CARE MODERATE MDM 30: CPT | Performed by: FAMILY MEDICINE

## 2021-07-28 NOTE — LETTER
7/28/2021        RE: Adebayo EISENBERG Tschann  [address]   [place] [address]        [facility]    Facility:  [facility] ([facility]) [[id number]]  Code Status: DNR      CHIEF COMPLAINT/REASON FOR VISIT:  Chief Complaint   Patient presents with     RECHECK       HISTORY:      HPI: Adebayo is a 89 year old female here at the [facility] on 7-week we will return with physical therapy.  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  She was recently on Seroquel and Zyprexa in the hospital but all been tapered off.  Including Haldol.  Her palate she does have dementia but her personality has been very good and she has been very pleasant here.  We are waiting for placement.  Pulses 2 jump all over the place 66-1 40 and right now she is at 77.  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  She is comfortable and has no pain issues and no complaints.    Past Medical History:   Diagnosis Date     Abnormal transaminases      Acute delirium      Acute encephalopathy      Aspiration pneumonia (H)      Atrial fibrillation (H)      Backache without radiation 09/17/2017     Bacteriuria      Chronic ischemic right MCA stroke      Compression fracture of L1 lumbar vertebra (H) 09/17/2017     Dysphagia      Essential hypertension, benign     Created by Conversion      HTN (hypertension)      Hypokalemia      Hyponatremia      Persistent atrial fibrillation with rapid ventricular response (H)      Seizure (H)              No family history on file.  Social History     Socioeconomic History     Marital status:      Spouse name: Not on file     Number of children: Not on file     Years of education: Not on file     Highest education level: Not on file   Occupational History     Not on file   Tobacco Use      Smoking status: Never Smoker     Smokeless tobacco: Never Used   Substance and Sexual Activity     Alcohol use: Yes     Comment: Alcoholic Drinks/day: 1 glass of red wine with dinner.     Drug use: No     Sexual activity: Not on file   Other Topics Concern     Not on file   Social History Narrative    Patient is accompanied by 4 family members. 09/16/17     Social Determinants of Health     Financial Resource Strain:      Difficulty of Paying Living Expenses:    Food Insecurity:      Worried About Running Out of Food in the Last Year:      Ran Out of Food in the Last Year:    Transportation Needs:      Lack of Transportation (Medical):      Lack of Transportation (Non-Medical):    Physical Activity:      Days of Exercise per Week:      Minutes of Exercise per Session:    Stress:      Feeling of Stress :    Social Connections:      Frequency of Communication with Friends and Family:      Frequency of Social Gatherings with Friends and Family:      Attends [employer] Services:      Active Member of Clubs or Organizations:      Attends Club or Organization Meetings:      Marital Status:    Intimate Partner Violence:      Fear of Current or Ex-Partner:      Emotionally Abused:      Physically Abused:      Sexually Abused:          REVIEW OF SYSTEM: Patient denies any pain fevers chills nausea vomit diarrhea change in vision hearing taste or smell weakness one-sided chest pain shortness of breath.  Denies any current shortness stool polyphagia polydipsia polyuria depression or anxiety and the main review of systems is negative.      PHYSICAL EXAM: Alert pleasant is not appear to be in acute distress very confused at this time.  And that is baseline.  Head was normocephalic and atraumatic sclera conjunctiva was clear.  Heart sounds were regular at this time regular rate and rhythm lungs are clear auscultation.  Extremities without cyanosis or edema.  Bowel sounds are positive in all 4 quadrants.        LABS:        ASSESSMENT:   Encounter Diagnoses   Name Primary?     Grand mal seizure (H) Yes     Aspiration pneumonia, unspecified aspiration pneumonia type, unspecified laterality, unspecified part of lung (H)      Altered mental status, unspecified altered mental status type      Atrial fibrillation with rapid ventricular response (H)         PLAN: Plan at this time we will continue to monitor above medical problems no new changes.  I will start treating her like she is long-term care at this time and be she has completed her TCU stay and there were no issues.  Likely I will see her once a month if she continues here with her bed but placement will be done soon.  And I will do a discharge summary when that time comes around.  Otherwise no new changes to care plan at this time.        Electronically signed by: NICOLE TANNER DO        Sincerely,        NICOLE TANNER DO

## 2021-08-06 ENCOUNTER — TELEPHONE (OUTPATIENT)
Dept: FAMILY MEDICINE | Facility: CLINIC | Age: 86
End: 2021-08-06

## 2021-08-06 NOTE — TELEPHONE ENCOUNTER
Reason for Call:  Other Verbal Order    Detailed comments: Pascale from [facility] called wondering if PCP will f/u via phone and fax. Pt was discharged from TCU and they received new home care orders. Please call Pascale back with response. Thank you.    Phone Number Pascale can be reached at: Other phone number:  [phone]    Best Time: anytime    Can we leave a detailed message on this number? YES, confidential line.    Call taken on 8/6/2021 at 11:38 AM by Nathen Nunes

## 2021-08-10 NOTE — CONFIDENTIAL NOTE
"I called back and left  A message - I think this is fine but would like her to clarify what she means by \"follow up by fax and phone\" - she can call or text my cell number  - given  "

## 2021-09-07 ENCOUNTER — DOCUMENTATION ONLY (OUTPATIENT)
Dept: GERIATRICS | Facility: CLINIC | Age: 86
End: 2021-09-07

## 2021-09-15 ENCOUNTER — LAB REQUISITION (OUTPATIENT)
Dept: LAB | Facility: CLINIC | Age: 86
End: 2021-09-15
Payer: COMMERCIAL

## 2021-09-15 DIAGNOSIS — R41.82 ALTERED MENTAL STATUS, UNSPECIFIED: ICD-10-CM

## 2021-09-15 LAB
ALBUMIN UR-MCNC: 10 MG/DL
AMORPH CRY #/AREA URNS HPF: ABNORMAL /HPF
APPEARANCE UR: ABNORMAL
BACTERIA #/AREA URNS HPF: ABNORMAL /HPF
BILIRUB UR QL STRIP: NEGATIVE
COLOR UR AUTO: YELLOW
GLUCOSE UR STRIP-MCNC: NEGATIVE MG/DL
HGB UR QL STRIP: ABNORMAL
KETONES UR STRIP-MCNC: NEGATIVE MG/DL
LEUKOCYTE ESTERASE UR QL STRIP: ABNORMAL
MUCOUS THREADS #/AREA URNS LPF: PRESENT /LPF
NITRATE UR QL: POSITIVE
PH UR STRIP: 7 [PH] (ref 5–7)
RBC URINE: 16 /HPF
SP GR UR STRIP: 1.01 (ref 1–1.03)
SQUAMOUS EPITHELIAL: 1 /HPF
UROBILINOGEN UR STRIP-MCNC: <2 MG/DL
WBC CLUMPS #/AREA URNS HPF: PRESENT /HPF
WBC URINE: >182 /HPF

## 2021-09-15 PROCEDURE — 81001 URINALYSIS AUTO W/SCOPE: CPT | Mod: ORL | Performed by: FAMILY MEDICINE

## 2021-09-15 PROCEDURE — 87086 URINE CULTURE/COLONY COUNT: CPT | Mod: ORL | Performed by: FAMILY MEDICINE

## 2021-09-17 LAB — BACTERIA UR CULT: ABNORMAL

## 2021-10-17 ENCOUNTER — HEALTH MAINTENANCE LETTER (OUTPATIENT)
Age: 86
End: 2021-10-17

## 2021-11-17 ENCOUNTER — LAB REQUISITION (OUTPATIENT)
Dept: LAB | Facility: CLINIC | Age: 86
End: 2021-11-17
Payer: COMMERCIAL

## 2021-11-17 DIAGNOSIS — R30.0 DYSURIA: ICD-10-CM

## 2021-11-17 LAB
ALBUMIN UR-MCNC: 50 MG/DL
APPEARANCE UR: ABNORMAL
BACTERIA #/AREA URNS HPF: ABNORMAL /HPF
BILIRUB UR QL STRIP: NEGATIVE
COLOR UR AUTO: YELLOW
GLUCOSE UR STRIP-MCNC: NEGATIVE MG/DL
HGB UR QL STRIP: NEGATIVE
KETONES UR STRIP-MCNC: NEGATIVE MG/DL
LEUKOCYTE ESTERASE UR QL STRIP: ABNORMAL
NITRATE UR QL: POSITIVE
PH UR STRIP: 5.5 [PH] (ref 5–7)
RBC URINE: 15 /HPF
SP GR UR STRIP: 1.02 (ref 1–1.03)
SQUAMOUS EPITHELIAL: 1 /HPF
UROBILINOGEN UR STRIP-MCNC: <2 MG/DL
WBC CLUMPS #/AREA URNS HPF: PRESENT /HPF
WBC URINE: >182 /HPF

## 2021-11-17 PROCEDURE — 87086 URINE CULTURE/COLONY COUNT: CPT | Mod: ORL

## 2021-11-17 PROCEDURE — 81001 URINALYSIS AUTO W/SCOPE: CPT | Mod: ORL | Performed by: OBSTETRICS & GYNECOLOGY

## 2021-11-17 PROCEDURE — 87086 URINE CULTURE/COLONY COUNT: CPT | Mod: ORL | Performed by: OBSTETRICS & GYNECOLOGY

## 2021-11-17 PROCEDURE — 81001 URINALYSIS AUTO W/SCOPE: CPT | Mod: ORL

## 2021-11-20 LAB — BACTERIA UR CULT: ABNORMAL

## 2022-02-02 ENCOUNTER — LAB REQUISITION (OUTPATIENT)
Dept: LAB | Facility: CLINIC | Age: 87
End: 2022-02-02
Payer: COMMERCIAL

## 2022-02-02 DIAGNOSIS — R30.0 DYSURIA: ICD-10-CM

## 2022-02-02 LAB
ALBUMIN UR-MCNC: 30 MG/DL
AMORPH CRY #/AREA URNS HPF: ABNORMAL /HPF
APPEARANCE UR: ABNORMAL
BACTERIA #/AREA URNS HPF: ABNORMAL /HPF
BILIRUB UR QL STRIP: NEGATIVE
COLOR UR AUTO: YELLOW
GLUCOSE UR STRIP-MCNC: NEGATIVE MG/DL
HGB UR QL STRIP: ABNORMAL
KETONES UR STRIP-MCNC: NEGATIVE MG/DL
LEUKOCYTE ESTERASE UR QL STRIP: ABNORMAL
MUCOUS THREADS #/AREA URNS LPF: PRESENT /LPF
NITRATE UR QL: NEGATIVE
PH UR STRIP: 6 [PH] (ref 5–7)
RBC URINE: 29 /HPF
SP GR UR STRIP: 1.03 (ref 1–1.03)
SQUAMOUS EPITHELIAL: 5 /HPF
UROBILINOGEN UR STRIP-MCNC: <2 MG/DL
WBC URINE: 21 /HPF

## 2022-02-02 PROCEDURE — 87086 URINE CULTURE/COLONY COUNT: CPT | Mod: ORL | Performed by: PHYSICIAN ASSISTANT

## 2022-02-02 PROCEDURE — 81001 URINALYSIS AUTO W/SCOPE: CPT | Mod: ORL | Performed by: PHYSICIAN ASSISTANT

## 2022-02-04 LAB — BACTERIA UR CULT: NORMAL

## 2022-07-24 ENCOUNTER — HEALTH MAINTENANCE LETTER (OUTPATIENT)
Age: 87
End: 2022-07-24

## 2022-10-02 ENCOUNTER — HEALTH MAINTENANCE LETTER (OUTPATIENT)
Age: 87
End: 2022-10-02

## 2023-01-01 ENCOUNTER — HEALTH MAINTENANCE LETTER (OUTPATIENT)
Age: 88
End: 2023-01-01